# Patient Record
Sex: FEMALE | Race: OTHER | HISPANIC OR LATINO | URBAN - METROPOLITAN AREA
[De-identification: names, ages, dates, MRNs, and addresses within clinical notes are randomized per-mention and may not be internally consistent; named-entity substitution may affect disease eponyms.]

---

## 2023-07-03 ENCOUNTER — EMERGENCY (EMERGENCY)
Facility: HOSPITAL | Age: 28
LOS: 1 days | Discharge: HOME | End: 2023-07-03
Admitting: EMERGENCY MEDICINE
Payer: COMMERCIAL

## 2023-07-03 VITALS
OXYGEN SATURATION: 98 % | HEIGHT: 64 IN | TEMPERATURE: 98 F | HEART RATE: 80 BPM | DIASTOLIC BLOOD PRESSURE: 79 MMHG | RESPIRATION RATE: 16 BRPM | SYSTOLIC BLOOD PRESSURE: 132 MMHG | WEIGHT: 141.98 LBS

## 2023-07-03 VITALS
TEMPERATURE: 98 F | OXYGEN SATURATION: 100 % | SYSTOLIC BLOOD PRESSURE: 128 MMHG | HEART RATE: 75 BPM | DIASTOLIC BLOOD PRESSURE: 87 MMHG | RESPIRATION RATE: 18 BRPM

## 2023-07-03 LAB
ANION GAP SERPL CALC-SCNC: 14 MMOL/L — SIGNIFICANT CHANGE UP (ref 5–17)
ANISOCYTOSIS BLD QL: SLIGHT — SIGNIFICANT CHANGE UP
APPEARANCE UR: CLEAR — SIGNIFICANT CHANGE UP
BACTERIA # UR AUTO: PRESENT /HPF
BASOPHILS # BLD AUTO: 0 K/UL — SIGNIFICANT CHANGE UP (ref 0–0.2)
BASOPHILS NFR BLD AUTO: 0 % — SIGNIFICANT CHANGE UP (ref 0–2)
BILIRUB UR-MCNC: ABNORMAL
BLD GP AB SCN SERPL QL: NEGATIVE — SIGNIFICANT CHANGE UP
BUN SERPL-MCNC: 10 MG/DL — SIGNIFICANT CHANGE UP (ref 7–23)
BURR CELLS BLD QL SMEAR: PRESENT — SIGNIFICANT CHANGE UP
CALCIUM SERPL-MCNC: 8.7 MG/DL — SIGNIFICANT CHANGE UP (ref 8.4–10.5)
CHLORIDE SERPL-SCNC: 103 MMOL/L — SIGNIFICANT CHANGE UP (ref 96–108)
CO2 SERPL-SCNC: 20 MMOL/L — LOW (ref 22–31)
COD CRY URNS QL: ABNORMAL /HPF
COLOR SPEC: YELLOW — SIGNIFICANT CHANGE UP
COMMENT - URINE: SIGNIFICANT CHANGE UP
CREAT SERPL-MCNC: 0.49 MG/DL — LOW (ref 0.5–1.3)
DIFF PNL FLD: ABNORMAL
EGFR: 132 ML/MIN/1.73M2 — SIGNIFICANT CHANGE UP
EOSINOPHIL # BLD AUTO: 0 K/UL — SIGNIFICANT CHANGE UP (ref 0–0.5)
EOSINOPHIL NFR BLD AUTO: 0 % — SIGNIFICANT CHANGE UP (ref 0–6)
EPI CELLS # UR: SIGNIFICANT CHANGE UP /HPF (ref 0–5)
GLUCOSE SERPL-MCNC: 95 MG/DL — SIGNIFICANT CHANGE UP (ref 70–99)
GLUCOSE UR QL: NEGATIVE — SIGNIFICANT CHANGE UP
HCG SERPL-ACNC: 400 MIU/ML — HIGH
HCT VFR BLD CALC: 32.5 % — LOW (ref 34.5–45)
HGB BLD-MCNC: 10.3 G/DL — LOW (ref 11.5–15.5)
HIV 1+2 AB+HIV1 P24 AG SERPL QL IA: SIGNIFICANT CHANGE UP
KETONES UR-MCNC: >=80 MG/DL
LEUKOCYTE ESTERASE UR-ACNC: ABNORMAL
LYMPHOCYTES # BLD AUTO: 2.12 K/UL — SIGNIFICANT CHANGE UP (ref 1–3.3)
LYMPHOCYTES # BLD AUTO: 23.1 % — SIGNIFICANT CHANGE UP (ref 13–44)
MACROCYTES BLD QL: SLIGHT — SIGNIFICANT CHANGE UP
MANUAL SMEAR VERIFICATION: SIGNIFICANT CHANGE UP
MCHC RBC-ENTMCNC: 23.7 PG — LOW (ref 27–34)
MCHC RBC-ENTMCNC: 31.7 GM/DL — LOW (ref 32–36)
MCV RBC AUTO: 74.9 FL — LOW (ref 80–100)
MONOCYTES # BLD AUTO: 0.6 K/UL — SIGNIFICANT CHANGE UP (ref 0–0.9)
MONOCYTES NFR BLD AUTO: 6.5 % — SIGNIFICANT CHANGE UP (ref 2–14)
NEUTROPHILS # BLD AUTO: 6.47 K/UL — SIGNIFICANT CHANGE UP (ref 1.8–7.4)
NEUTROPHILS NFR BLD AUTO: 70.4 % — SIGNIFICANT CHANGE UP (ref 43–77)
NITRITE UR-MCNC: NEGATIVE — SIGNIFICANT CHANGE UP
OVALOCYTES BLD QL SMEAR: SLIGHT — SIGNIFICANT CHANGE UP
PH UR: 6 — SIGNIFICANT CHANGE UP (ref 5–8)
PLAT MORPH BLD: ABNORMAL
PLATELET # BLD AUTO: 292 K/UL — SIGNIFICANT CHANGE UP (ref 150–400)
POIKILOCYTOSIS BLD QL AUTO: SLIGHT — SIGNIFICANT CHANGE UP
POTASSIUM SERPL-MCNC: 3.3 MMOL/L — LOW (ref 3.5–5.3)
POTASSIUM SERPL-SCNC: 3.3 MMOL/L — LOW (ref 3.5–5.3)
PROT UR-MCNC: 30 MG/DL
RBC # BLD: 4.34 M/UL — SIGNIFICANT CHANGE UP (ref 3.8–5.2)
RBC # FLD: 18.1 % — HIGH (ref 10.3–14.5)
RBC BLD AUTO: ABNORMAL
RBC CASTS # UR COMP ASSIST: ABNORMAL /HPF
RH IG SCN BLD-IMP: NEGATIVE — SIGNIFICANT CHANGE UP
SMUDGE CELLS # BLD: PRESENT — SIGNIFICANT CHANGE UP
SODIUM SERPL-SCNC: 137 MMOL/L — SIGNIFICANT CHANGE UP (ref 135–145)
SP GR SPEC: >=1.03 — SIGNIFICANT CHANGE UP (ref 1–1.03)
UROBILINOGEN FLD QL: 1 E.U./DL — SIGNIFICANT CHANGE UP
WBC # BLD: 9.19 K/UL — SIGNIFICANT CHANGE UP (ref 3.8–10.5)
WBC # FLD AUTO: 9.19 K/UL — SIGNIFICANT CHANGE UP (ref 3.8–10.5)
WBC UR QL: ABNORMAL /HPF

## 2023-07-03 PROCEDURE — 87184 SC STD DISK METHOD PER PLATE: CPT

## 2023-07-03 PROCEDURE — 87491 CHLMYD TRACH DNA AMP PROBE: CPT

## 2023-07-03 PROCEDURE — 86850 RBC ANTIBODY SCREEN: CPT

## 2023-07-03 PROCEDURE — 85025 COMPLETE CBC W/AUTO DIFF WBC: CPT

## 2023-07-03 PROCEDURE — 86901 BLOOD TYPING SEROLOGIC RH(D): CPT

## 2023-07-03 PROCEDURE — 87389 HIV-1 AG W/HIV-1&-2 AB AG IA: CPT

## 2023-07-03 PROCEDURE — 81001 URINALYSIS AUTO W/SCOPE: CPT

## 2023-07-03 PROCEDURE — 99284 EMERGENCY DEPT VISIT MOD MDM: CPT

## 2023-07-03 PROCEDURE — 87800 DETECT AGNT MULT DNA DIREC: CPT

## 2023-07-03 PROCEDURE — 87086 URINE CULTURE/COLONY COUNT: CPT

## 2023-07-03 PROCEDURE — 86780 TREPONEMA PALLIDUM: CPT

## 2023-07-03 PROCEDURE — 36415 COLL VENOUS BLD VENIPUNCTURE: CPT

## 2023-07-03 PROCEDURE — 86900 BLOOD TYPING SEROLOGIC ABO: CPT

## 2023-07-03 PROCEDURE — 87591 N.GONORRHOEAE DNA AMP PROB: CPT

## 2023-07-03 PROCEDURE — 76801 OB US < 14 WKS SINGLE FETUS: CPT | Mod: 26

## 2023-07-03 PROCEDURE — 76801 OB US < 14 WKS SINGLE FETUS: CPT

## 2023-07-03 PROCEDURE — 84702 CHORIONIC GONADOTROPIN TEST: CPT

## 2023-07-03 PROCEDURE — 80048 BASIC METABOLIC PNL TOTAL CA: CPT

## 2023-07-03 RX ORDER — VALACYCLOVIR 500 MG/1
1 TABLET, FILM COATED ORAL
Qty: 14 | Refills: 0
Start: 2023-07-03 | End: 2023-07-09

## 2023-07-03 RX ORDER — VALACYCLOVIR 500 MG/1
1000 TABLET, FILM COATED ORAL ONCE
Refills: 0 | Status: COMPLETED | OUTPATIENT
Start: 2023-07-03 | End: 2023-07-03

## 2023-07-03 RX ORDER — VALACYCLOVIR 500 MG/1
1 TABLET, FILM COATED ORAL
Qty: 14 | Refills: 0
Start: 2023-07-03

## 2023-07-03 RX ORDER — HYDROCORTISONE 1 %
1 OINTMENT (GRAM) TOPICAL
Qty: 1 | Refills: 0
Start: 2023-07-03

## 2023-07-03 RX ORDER — LIDOCAINE 4 G/100G
1 CREAM TOPICAL
Qty: 1 | Refills: 0
Start: 2023-07-03

## 2023-07-03 RX ORDER — LIDOCAINE 4 G/100G
1 CREAM TOPICAL ONCE
Refills: 0 | Status: COMPLETED | OUTPATIENT
Start: 2023-07-03 | End: 2023-07-03

## 2023-07-03 RX ORDER — OXYCODONE AND ACETAMINOPHEN 5; 325 MG/1; MG/1
1 TABLET ORAL
Qty: 8 | Refills: 0
Start: 2023-07-03

## 2023-07-03 RX ADMIN — VALACYCLOVIR 1000 MILLIGRAM(S): 500 TABLET, FILM COATED ORAL at 13:39

## 2023-07-03 RX ADMIN — LIDOCAINE 1 APPLICATION(S): 4 CREAM TOPICAL at 15:28

## 2023-07-03 NOTE — ED PROVIDER NOTE - PATIENT PORTAL LINK FT
You can access the FollowMyHealth Patient Portal offered by [facility] by registering at the following website: [email or web address]. By joining [employer]’s FollowMyHealth portal, you will also be able to view your health information using other applications (apps) compatible with our system.

## 2023-07-03 NOTE — ED PROVIDER NOTE - NSFOLLOWUPINSTRUCTIONS_ED_ALL_ED_FT
Return to ED in 2-3 days for repeat HCG level; you may have a repeat ultrasound depending on the HCG level at that time.    Return to ED IMMEDIATELY for sudden increasing pelvic/back pain, vaginal bleeding, feeling weak/faint, or other new/worsening symptoms.  =========================  First Trimester Pregnancy    WHAT YOU NEED TO KNOW:    What do I need to know about the first trimester of pregnancy? A normal pregnancy lasts about 40 weeks. The first trimester lasts from your last period through the 12th week of pregnancy.    What is prenatal care? Prenatal care is a series of visits with your healthcare provider throughout your pregnancy. Prenatal care can help prevent problems during pregnancy and childbirth. At each prenatal visit, your healthcare provider will weigh you and check your blood pressure. Your healthcare provider will also check your baby's heartbeat and growth. You may also need the following at some visits:    A pelvic exam allows your healthcare provider to see your cervix (the bottom part of your uterus). Your healthcare provider will use a speculum to open your vagina. He or she will check the size and shape of your uterus.    Blood tests may be done to check for any of the following:  Gestational diabetes or anemia (low iron level)    Blood type or Rh factor, or certain birth defects    Immunity to certain diseases, such as chickenpox or rubella    An infection, such as a sexually transmitted infection, HIV, or hepatitis B    Hepatitis B may need to be prevented or treated. Hepatitis B is inflammation of the liver caused by the hepatitis B virus (HBV). HBV can spread from a mother to her baby during delivery. You will be checked for HBV as early as possible in the first trimester of each pregnancy. You need the test even if you received the hepatitis B vaccine or were tested before. You may need to have an HBV infection treated before you give birth.    Urine tests may also be done to check for sugar and protein. These can be signs of gestational diabetes or preeclampsia. Urine tests may also be done to check for signs of infection.    A fetal ultrasound shows pictures of your baby inside your uterus. The pictures are used to check your baby's development, movement, and position. Your healthcare provider may be able to tell you if you are having a boy or a girl during the ultrasound. This is usually possible at around 18 to 22 weeks.  Pregnancy Ultrasound      Genetic disorder screening tests may be offered to you. These screening tests check your baby's risk for genetic disorders such as Down syndrome. A screening test includes a blood test and ultrasound. Blood tests may be used to check your DNA or your partner's DNA. Genetic tests are not always accurate or complete. Your baby may be born with a genetic disorder that did not show up in the tests. Talk to your healthcare provider about any concerns you have with genetic testing.  What can I do to have a healthy pregnancy?  Tips for a Healthy Pregnancy    Take prenatal vitamins as directed. Prenatal vitamins can help you get the amount of vitamins and minerals you need during pregnancy. Prenatal vitamins may also decrease the risk of certain birth defects.  Sources of Folic Acid      Eat a variety of healthy foods. Healthy foods include fruits, vegetables, whole-grain breads, low-fat dairy foods, beans, lean meats, and fish. Drink liquids as directed. Ask how much liquid to drink each day and which liquids are best for you. Limit caffeine to less than 200 milligrams each day. Limit your intake of fish to 2 servings each week. Choose fish low in mercury such as canned light tuna, shrimp, crab, salmon, cod, or tilapia. Do not eat fish high in mercury such as swordfish, tilefish, salvador mackerel, and shark.  Healthy Foods      Drink liquids as directed. Ask how much liquid to drink each day and which liquids are best for you. Some healthy liquids include milk, water, and juice. It is not clear how caffeine affects pregnancy. Limit your intake of caffeine to less than 200 mg each day to avoid possible health problems. Caffeine may be found in coffee, tea, cola, sports drinks, and chocolate. Do not drink alcohol.    Talk to your healthcare provider before you take medicines. Many medicines can harm your baby, especially during early pregnancy. Ask your healthcare provider before you take any medicines, including over-the-counter medicines, vitamins, herbs, or food supplements. Never use illegal or street drugs while you are pregnant. Talk to your healthcare provider if you are having trouble quitting street drugs.    Exercise as directed. Ask your healthcare provider about the best exercise plan for you. Exercise may help you feel better and make your labor and delivery easier.  Walking During Pregnancy      Do not smoke. Smoking increases your risk of a miscarriage and other health problems during your pregnancy. Smoking can cause your baby to be born too early or weigh less at birth. Quit smoking as soon as you think you might be pregnant. Ask your healthcare provider for information if you need help quitting.  What are some safety tips?    Do not use a hot tub or sauna while you are pregnant, especially during your first trimester. Hot tubs and saunas may raise your baby's temperature and increase the risk of birth defects. It also increases your risk of miscarriage.    Protect yourself from illness. Toxoplasmosis is a disease that can cause birth defects. To protect yourself from this disease, do not clean your cat's litter box yourself. Ask someone else to clean your cat's litter box while you are pregnant. Also, do not eat raw meat or unwashed fruits and vegetables. Wash your hands after you touch raw meat, and eat only well-cooked meat. Wash fruits and vegetables well before you eat them.  Handwashing  When should I seek immediate care?    You have pain or cramping in your abdomen or low back.    You have severe vaginal bleeding or clotting.  When should I call my doctor or obstetrician?    You have light bleeding.    You have chills or a fever.    You have vaginal itching, burning, or pain.    You have yellow, green, white, or foul-smelling vaginal discharge.    You have pain or burning when you urinate, less urine than usual, or pink or bloody urine.    You have questions or concerns about your condition or care.  CARE AGREEMENT:    You have the right to help plan your care. Learn about your health condition and how it may be treated. Discuss treatment options with your healthcare providers to decide what care you want to receive. You always have the right to refuse treatment.  ===================  Genital Herpes Infection    WHAT YOU NEED TO KNOW:    What is a genital herpes infection? Genital herpes is a sexually transmitted infection (STI) caused by the herpes simplex virus (HSV). HSV has 2 types. A genital HSV infection is usually caused by HSV type 2. HSV type 1 usually affects the oral area but may also affect the genitals.    What increases my risk for a genital HSV infection?    Close contact with someone who has an HSV infection    Unprotected sex, more than 1 sex partner, or being a man who has sex with men    Being female (HSV passed during vaginal sex)    A weak immune system caused by an illness or condition such as a cold, flu, or HIV    Shared personal items, such as towels or razors    Broken or torn skin on your genitals or buttocks  What are the signs and symptoms of a genital HSV infection? You may not have any signs or symptoms. Signs and symptoms that do develop may appear suddenly and last 8 days to 3 weeks. Symptoms will go away and may come back again. Your blisters may burst or join together to form large open sores. Sores on your skin will then dry up (crust over). You may also have any of the following:    Pain, itching, or tingling at the affected area    Blisters or small, round, shallow sores on the thighs, genitals, or buttocks    Fever, headache, or muscle pains    Swollen lymph nodes in your groin    Loss of appetite    Vaginal discharge that is not normal for you    Pain when you urinate  How is a genital HSV infection diagnosed? Your healthcare provider will examine your blisters or sores. Your provider may ask if you have other medical conditions. Tell your provider when your blisters or sores started, and about your other symptoms. Tell your provider about any STIs you or your partner may have. You may also need any of the following:    Skin or fluid samples may be sent to a lab to be tested for HSV. Your provider may swab fluid from a blister. Your provider may also scrape skin from a sore. Skin scraping needs to be done within 48 hours of the first symptom, when the outbreak is most severe.    Blood tests may show you have developed antibodies from exposure to HSV. Blood tests may also show which type of HSV you have.  How is a genital HSV infection treated? An HSV infection cannot be cured. The blisters or sores may heal on their own within 8 days. The following may be used to treat or prevent symptoms:    Antiviral medicine helps relieve symptoms and shortens the amount of time you have blisters. You may also need to take it daily to prevent blisters.    Numbing cream or ointment may help decrease pain.  How can I manage my symptoms?    Keep your blisters and sores clean and dry. Do not wipe or scrub your blisters or sores. Rinse the area with clean, warm water and gently pat the area dry.    Wear cotton underwear and loose clothing to prevent irritation. Cover open sores with a bandage to prevent your clothes from sticking to them.    Use ice to help reduce your swelling and pain. Use an ice pack, or put crushed ice in a plastic bag. Cover the bag with a towel before you place it on your skin. Apply ice for 15 to 20 minutes every hour, or as directed.    Use heat to help decrease your muscle pain. Use an electric heating pad set on low, a hot water bottle, or a warm compress. Always put a cloth between your skin and the heat pack to prevent burns. A warm bath or shower may also help decrease your muscle pain. Apply heat for 20 to 30 minutes every 2 hours, or as directed.  How can I prevent the spread of HSV?    Tell your partner you have genital HSV. Ask your healthcare provider for ways to tell your partner about your infection. Your partner may also need to be tested for genital HSV even if no symptoms develop. Do not have sex while you or your partner have symptoms.    Use a male or female condom during sex. This includes oral, genital, and anal sex. Use a new condom each time. Use a polyurethane condom if you are allergic to latex. Do not use male and female condoms together. Ask for more information about the correct way to use condoms.    Do not share personal items with others. Examples include towels, razors, and clothing.    Do not touch your sores, blisters, or scabs. The virus may spread from your fingers.    Wash your hands often. Use soap and water. Use germ-killing gel if soap and water are not available.  Handwashing      Tell your healthcare providers if you are pregnant. HSV can spread to your baby during your pregnancy or during vaginal delivery. You may need to take antiviral medicine before you give birth. Antiviral medicine can help prevent the spread of infection to your baby. You may need a  section () if your symptoms are severe. Do not breastfeed if you have blisters or sores on your breast. Do not give your baby pumped breast milk if the pump or milk touches a blister or sore.    Get tested for STIs if you are sexually active. HSV type 2 increases your risk for HIV. You should get tested after you have sex with any new partner or if you have sex without a condom. This includes oral, genital, or anal sex.  When should I seek immediate care?    You have changes in your vision or sudden eye pain.    You have eye pain when you look into bright lights.    You have sores on your eyes.    You have a severe headache or are confused.  When should I call my doctor?    You see blood or fluid that is not clear coming out of your sores, penis, or vagina.    Your symptoms get worse, have not gone away within 8 days, or come back again.    You have trouble urinating.    You know or think you are pregnant, and you are bleeding from your vagina.    Your eyes feel irritated, or you feel like you have something in your eye.    You have questions or concerns about your condition or care.  CARE AGREEMENT:    You have the right to help plan your care. Learn about your health condition and how it may be treated. Discuss treatment options with your healthcare providers to decide what care you want to receive. You always have the right to refuse treatment.

## 2023-07-03 NOTE — ED PROVIDER NOTE - CLINICAL SUMMARY MEDICAL DECISION MAKING FREE TEXT BOX
Pt's history and exam strongly suggestive of genital herpes, classic case. No evidence of vulvar cellulitis or nec fasc.  Will test for other STI and HIV, treat symptomatically and start antiviral tx for first episode genital herpes.  Triage complaint was due to her embarrassment discussing her actual complaint.

## 2023-07-03 NOTE — ED PROVIDER NOTE - OBJECTIVE STATEMENT
Patient is a 28-year-old female without significant medical history complains of pain vulvar lesions over the past 3 days.  She reports a new sexual partner about 2 weeks ago, was intoxicated and does not remember if it were used.  It is extremely painful for her to urinate and the pain radiates to the low back.    She also reports some tender bumps to the inside of her lower lip that have essentially resolved.  No vaginal bleeding. No fever or chills. No abdominal pain. Had D&C a few months ago for VTOP.  No hx of STI. Patient is a 28-year-old female without significant medical history complains of pain vulvar lesions over the past 3 days.  She reports a new sexual partner about 2 weeks ago, was intoxicated and does not remember if it were used.  It is extremely painful for her to urinate and she has some mild dull pain in the low back.    She also reports some tender bumps to the inside of her lower lip that have essentially resolved.  No vaginal bleeding. No fever or chills. No abdominal pain. Had D&C a few months ago for VTOP.  No hx of STI.

## 2023-07-03 NOTE — ED PROVIDER NOTE - PHYSICAL EXAMINATION
CONSTITUTIONAL: NAD   SKIN: Normal color and turgor.    HEAD: NC/AT.  EYES: Conjunctiva clear. EOMI. PERRL.    ENT: Airway clear. Normal voice.   RESPIRATORY:  Normal work of breathing. Lungs CTAB.  CARDIOVASCULAR:  RRR, S1S2. No M/R/G.      GI:  Abdomen soft, nontender.   : several vesicular lesions to right labia majora. no swelling or surrounding cellulitis.   no internal vaginal lesions seen. no cervical discharge. no CMT.     MSK: Neck supple.  No LE edema or calf tenderness. No joint swelling or ROM limitation.  NEURO: Alert; CN: grossly intact. Speech clear.  CLAROS. Gait steady.

## 2023-07-03 NOTE — ED ADULT NURSE NOTE - OBJECTIVE STATEMENT
28y F, presents to the ED c/o body aches, chills, cough, nausea, vomiting, and fatigue x3 days. Pt reports taking ibuprofen and tylenol w/o relief. Denies Denies chest pain, SOB, fever. Pt A&Ox4, ambulatory with steady gait, speaking in clear/full sentences, vital signs stable on room air.

## 2023-07-03 NOTE — ED ADULT TRIAGE NOTE - CHIEF COMPLAINT QUOTE
Pt presenting with 3 days of flu like symptoms. Endorsing n/v, fatigue and cough. Denies CP/SOB/fevers/sore throat.

## 2023-07-03 NOTE — ED PROVIDER NOTE - PROGRESS NOTE DETAILS
. No EUP or IUP seen on US (transabdominal only, pt was unable to tolerate TVUS due to painful vulvar herpetic infection).  Patient requesting discharge home, does not want to have GYN consult today. Discussed possible early pregnancy vs ectopic pregnancy, lengthy discussion regarding the importance of follow up (to return to ED in 2-3 days for HCG, +/- US), and to return to ED immediately for sudden increasing pelvic/back pain, vaginal bleeding, feeling weak/faint, or other new/worsening symptoms. Pt expressed clear understanding with teach-back.  Rh negative, not bleeding, no Rhogam.  Pain currently well controlled.

## 2023-07-04 LAB
C TRACH RRNA SPEC QL NAA+PROBE: SIGNIFICANT CHANGE UP
N GONORRHOEA RRNA SPEC QL NAA+PROBE: SIGNIFICANT CHANGE UP
SPECIMEN SOURCE: SIGNIFICANT CHANGE UP
T PALLIDUM AB TITR SER: NEGATIVE — SIGNIFICANT CHANGE UP
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
## 2023-07-05 DIAGNOSIS — M25.561 PAIN IN RIGHT KNEE: ICD-10-CM

## 2023-07-05 DIAGNOSIS — G89.29 OTHER CHRONIC PAIN: ICD-10-CM
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
## 2023-07-05 LAB
-  CLINDAMYCIN: SIGNIFICANT CHANGE UP
-  LEVOFLOXACIN: SIGNIFICANT CHANGE UP
CANDIDA AB TITR SER: DETECTED
CULTURE RESULTS: SIGNIFICANT CHANGE UP
G VAGINALIS DNA SPEC QL NAA+PROBE: DETECTED
METHOD TYPE: SIGNIFICANT CHANGE UP
ORGANISM # SPEC MICROSCOPIC CNT: SIGNIFICANT CHANGE UP
ORGANISM # SPEC MICROSCOPIC CNT: SIGNIFICANT CHANGE UP
SPECIMEN SOURCE: SIGNIFICANT CHANGE UP
T VAGINALIS SPEC QL WET PREP: SIGNIFICANT CHANGE UP

## 2023-07-06 ENCOUNTER — EMERGENCY (EMERGENCY)
Facility: HOSPITAL | Age: 28
LOS: 1 days | Discharge: HOME | End: 2023-07-06
Admitting: STUDENT IN AN ORGANIZED HEALTH CARE EDUCATION/TRAINING PROGRAM
Payer: COMMERCIAL

## 2023-07-06 VITALS
SYSTOLIC BLOOD PRESSURE: 120 MMHG | HEART RATE: 75 BPM | WEIGHT: 141.98 LBS | RESPIRATION RATE: 18 BRPM | TEMPERATURE: 98 F | HEIGHT: 64 IN | OXYGEN SATURATION: 100 % | DIASTOLIC BLOOD PRESSURE: 77 MMHG

## 2023-07-06 DIAGNOSIS — O02.81 INAPPROPRIATE CHANGE IN QUANTITATIVE HUMAN CHORIONIC GONADOTROPIN (HCG) IN EARLY PREGNANCY: ICD-10-CM

## 2023-07-06 LAB — HCG SERPL-ACNC: 1487 MIU/ML — HIGH

## 2023-07-06 PROCEDURE — 36415 COLL VENOUS BLD VENIPUNCTURE: CPT

## 2023-07-06 PROCEDURE — 84702 CHORIONIC GONADOTROPIN TEST: CPT

## 2023-07-06 PROCEDURE — 99283 EMERGENCY DEPT VISIT LOW MDM: CPT

## 2023-07-06 RX ORDER — METRONIDAZOLE 7.5 MG/G
1 GEL VAGINAL
Qty: 1 | Refills: 0
Start: 2023-07-06 | End: 2023-07-10

## 2023-07-06 RX ORDER — ACETAMINOPHEN 500 MG
650 TABLET ORAL ONCE
Refills: 0 | Status: COMPLETED | OUTPATIENT
Start: 2023-07-06 | End: 2023-07-06

## 2023-07-06 RX ADMIN — Medication 650 MILLIGRAM(S): at 15:15

## 2023-07-06 NOTE — ED PROVIDER NOTE - NEUROLOGICAL, MLM
Patient notified of amended results. Additional views canceled. RUKHSANA Herrera RN   Alert and oriented, no focal deficits, no motor or sensory deficits.

## 2023-07-06 NOTE — ED PROVIDER NOTE - OBJECTIVE STATEMENT
29 y/o f presents for hcg follow up.  Pt stating she was in ED 3 days ago with vaginal lesions, incidentally pregnant and had a low hcg level and nothing on u/s at the time, was asked to return.  Pt stating she has no pain or bleeding.

## 2023-07-06 NOTE — ED PROVIDER NOTE - CLINICAL SUMMARY MEDICAL DECISION MAKING FREE TEXT BOX
27 y/o f presents for follow up hcg after had hcg 400 in ED 3 days ago with no findings on u/s.  Pt with no pain or bleeding, denies pain or bleeding at any point, hcg is rising appropriately to 1487, not concerning for ectopic, will d/c, can f/u with her ob/gyn, return to ED if sx worsen.

## 2023-07-06 NOTE — ED ADULT NURSE NOTE - CHIEF COMPLAINT QUOTE
"I was told to come back to get my HCG levels tested. I came in the 3rd and was told they where 400 but they could not see anything in the Ultrasound". Denies vaginal bleeding.

## 2023-07-06 NOTE — ED PROVIDER NOTE - PATIENT PORTAL LINK FT
You can access the FollowMyHealth Patient Portal offered by [facility] by registering at the following website: [email or web address]. By joining [employer]’s FollowMyHealth portal, you will also be able to view your health information using other applications (apps) compatible with our system.

## 2023-07-06 NOTE — ED ADULT NURSE NOTE - NSFALLUNIVINTERV_ED_ALL_ED
Bed/Stretcher in lowest position, wheels locked, appropriate side rails in place/Call bell, personal items and telephone in reach/Instruct patient to call for assistance before getting out of bed/chair/stretcher/Non-slip footwear applied when patient is off stretcher/[place] to call system/Physically safe environment - no spills, clutter or unnecessary equipment/Purposeful proactive rounding/Room/bathroom lighting operational, light cord in reach

## 2023-09-14 ENCOUNTER — EMERGENCY (EMERGENCY)
Facility: HOSPITAL | Age: 28
LOS: 1 days | Discharge: HOME | End: 2023-09-14
Attending: EMERGENCY MEDICINE | Admitting: EMERGENCY MEDICINE
Payer: COMMERCIAL

## 2023-09-14 VITALS
WEIGHT: 134.04 LBS | OXYGEN SATURATION: 98 % | HEART RATE: 96 BPM | SYSTOLIC BLOOD PRESSURE: 110 MMHG | DIASTOLIC BLOOD PRESSURE: 71 MMHG | RESPIRATION RATE: 18 BRPM | TEMPERATURE: 98 F | HEIGHT: 72 IN

## 2023-09-14 VITALS
DIASTOLIC BLOOD PRESSURE: 67 MMHG | RESPIRATION RATE: 18 BRPM | SYSTOLIC BLOOD PRESSURE: 109 MMHG | TEMPERATURE: 98 F | HEART RATE: 97 BPM | OXYGEN SATURATION: 98 %

## 2023-09-14 DIAGNOSIS — O99.891 OTHER SPECIFIED DISEASES AND CONDITIONS COMPLICATING PREGNANCY: ICD-10-CM

## 2023-09-14 DIAGNOSIS — Z3A.14 14 WEEKS GESTATION OF PREGNANCY: ICD-10-CM

## 2023-09-14 DIAGNOSIS — R55 SYNCOPE AND COLLAPSE: ICD-10-CM

## 2023-09-14 DIAGNOSIS — Z88.8 ALLERGY STATUS TO OTHER DRUGS, MEDICAMENTS AND BIOLOGICAL SUBSTANCES: ICD-10-CM

## 2023-09-14 DIAGNOSIS — R10.10 UPPER ABDOMINAL PAIN, UNSPECIFIED: ICD-10-CM

## 2023-09-14 PROCEDURE — 85025 COMPLETE CBC W/AUTO DIFF WBC: CPT

## 2023-09-14 PROCEDURE — 96374 THER/PROPH/DIAG INJ IV PUSH: CPT

## 2023-09-14 PROCEDURE — 99284 EMERGENCY DEPT VISIT MOD MDM: CPT

## 2023-09-14 PROCEDURE — 82553 CREATINE MB FRACTION: CPT

## 2023-09-14 PROCEDURE — 80053 COMPREHEN METABOLIC PANEL: CPT

## 2023-09-14 PROCEDURE — 85730 THROMBOPLASTIN TIME PARTIAL: CPT

## 2023-09-14 PROCEDURE — 93010 ELECTROCARDIOGRAM REPORT: CPT

## 2023-09-14 PROCEDURE — 85610 PROTHROMBIN TIME: CPT

## 2023-09-14 PROCEDURE — 76805 OB US >/= 14 WKS SNGL FETUS: CPT | Mod: 26

## 2023-09-14 PROCEDURE — 76805 OB US >/= 14 WKS SNGL FETUS: CPT

## 2023-09-14 PROCEDURE — 84484 ASSAY OF TROPONIN QUANT: CPT

## 2023-09-14 PROCEDURE — 99285 EMERGENCY DEPT VISIT HI MDM: CPT | Mod: 25

## 2023-09-14 PROCEDURE — 76817 TRANSVAGINAL US OBSTETRIC: CPT

## 2023-09-14 PROCEDURE — 83735 ASSAY OF MAGNESIUM: CPT

## 2023-09-14 PROCEDURE — 82550 ASSAY OF CK (CPK): CPT

## 2023-09-14 PROCEDURE — 36415 COLL VENOUS BLD VENIPUNCTURE: CPT

## 2023-09-14 PROCEDURE — 76817 TRANSVAGINAL US OBSTETRIC: CPT | Mod: 26

## 2023-09-14 PROCEDURE — 93005 ELECTROCARDIOGRAM TRACING: CPT

## 2023-09-14 RX ORDER — ONDANSETRON 8 MG/1
4 TABLET, FILM COATED ORAL ONCE
Refills: 0 | Status: COMPLETED | OUTPATIENT
Start: 2023-09-14 | End: 2023-09-14

## 2023-09-14 RX ORDER — SODIUM CHLORIDE 9 MG/ML
1000 INJECTION INTRAMUSCULAR; INTRAVENOUS; SUBCUTANEOUS ONCE
Refills: 0 | Status: COMPLETED | OUTPATIENT
Start: 2023-09-14 | End: 2023-09-14

## 2023-09-14 RX ADMIN — SODIUM CHLORIDE 1000 MILLILITER(S): 9 INJECTION INTRAMUSCULAR; INTRAVENOUS; SUBCUTANEOUS at 15:40

## 2023-09-14 RX ADMIN — ONDANSETRON 4 MILLIGRAM(S): 8 TABLET, FILM COATED ORAL at 15:40

## 2023-09-14 NOTE — ED ADULT NURSE NOTE - OBJECTIVE STATEMENT
patient presents to ED with syncope, states she hit her abdomen, denies LOC, cp, sob, fever, cough, n/v. is complaining of mild dizziness. A&OX4. stable at bed.

## 2023-09-14 NOTE — ED PROVIDER NOTE - OBJECTIVE STATEMENT
28 F at 14 weeks pregnant w syncopal/near complete syncopal event- was in the bathrrom- felt very lightheaded- passed out- thinks she hit her abd on the tub- mild upper abd pain- no vag bleeding/dc no n/v no ha no cp  mod severity  no AC  no sig exac/allev factors

## 2023-09-14 NOTE — ED PROVIDER NOTE - PATIENT PORTAL LINK FT
You can access the FollowMyHealth Patient Portal offered by [facility] by registering at the following website: [email or web address]. By joining [employer]’s FollowMyHealth portal, you will also be able to view your health information using other applications (apps) compatible with our system.

## 2023-09-14 NOTE — ED PROVIDER NOTE - CLINICAL SUMMARY MEDICAL DECISION MAKING FREE TEXT BOX
28 F at 14 weeks ga w syncopal episode- likely VV by hx- px thinks she may have hit her abd= us neg- now feels well no pain, wants to go home  si/sx to return to the ER d/w px

## 2023-09-14 NOTE — ED ADULT NURSE NOTE - NSFALLRISK_ED_ALL_ED
I will STOP taking the medications listed below when I get home from the hospital:    aspirin 81 mg oral tablet  -- 2 tab(s) by mouth once a day (at bedtime)
No

## 2023-09-14 NOTE — ED ADULT NURSE NOTE - NSFALLUNIVINTERV_ED_ALL_ED
Bed/Stretcher in lowest position, wheels locked, appropriate side rails in place/Call bell, personal items and telephone in reach/Instruct patient to call for assistance before getting out of bed/chair/stretcher/Non-slip footwear applied when patient is off stretcher/[place] to call system/Physically safe environment - no spills, clutter or unnecessary equipment/Purposeful proactive rounding/Room/bathroom lighting operational, light cord in reach

## 2023-09-14 NOTE — ED ADULT NURSE NOTE - BEFAST ARM NUMBNESS
12/22/17 0821   Patient Assessment/Suction   Level of Consciousness (AVPU) alert   Respiratory Effort Normal;Unlabored   Expansion/Accessory Muscles/Retractions no use of accessory muscles;no retractions;expansion symmetric   Cough Frequency infrequent   Cough Type none   PRE-TX-O2-ETCO2   O2 Device (Oxygen Therapy) room air   SpO2 98 %   Pulse Oximetry Type Intermittent   $ Pulse Oximetry - Multiple Charge Pulse Oximetry - Multiple   Pulse 85   Resp 16       Patient sitting up in bed eating breakfast with no respiratory distress noted.    No

## 2023-09-14 NOTE — ED ADULT TRIAGE NOTE - CHIEF COMPLAINT QUOTE
Pt presents to ED here for syncopal episode an hour ago in the bathroom. Pt is 14 weeks pregnant. Denies headache od dizziness, cp, sob, abd pain. PT reports she landed on her abdomen. Pt A&Ox4. Pt is conversive in full sentences and ambulatory. no known PMHx.

## 2023-10-11 ENCOUNTER — NON-APPOINTMENT (OUTPATIENT)
Age: 28
End: 2023-10-11

## 2023-10-12 ENCOUNTER — APPOINTMENT (OUTPATIENT)
Dept: OBGYN | Facility: CLINIC | Age: 28
End: 2023-10-12
Payer: MEDICAID

## 2023-10-12 ENCOUNTER — TRANSCRIPTION ENCOUNTER (OUTPATIENT)
Age: 28
End: 2023-10-12

## 2023-10-12 ENCOUNTER — NON-APPOINTMENT (OUTPATIENT)
Age: 28
End: 2023-10-12

## 2023-10-12 VITALS
DIASTOLIC BLOOD PRESSURE: 60 MMHG | OXYGEN SATURATION: 98 % | SYSTOLIC BLOOD PRESSURE: 110 MMHG | BODY MASS INDEX: 24.59 KG/M2 | WEIGHT: 144 LBS | HEIGHT: 64 IN

## 2023-10-12 PROBLEM — Z00.00 ENCOUNTER FOR PREVENTIVE HEALTH EXAMINATION: Status: ACTIVE | Noted: 2023-10-12

## 2023-10-12 PROCEDURE — 99202 OFFICE O/P NEW SF 15 MIN: CPT | Mod: TH

## 2023-10-13 ENCOUNTER — TRANSCRIPTION ENCOUNTER (OUTPATIENT)
Age: 28
End: 2023-10-13

## 2023-10-13 LAB
BLD GP AB SCN SERPL QL: NORMAL
FERRITIN SERPL-MCNC: 6 NG/ML
HCT VFR BLD CALC: 29.2 %
HGB BLD-MCNC: 9.2 G/DL
IRON SATN MFR SERPL: 4 %
IRON SERPL-MCNC: 20 UG/DL
MCHC RBC-ENTMCNC: 25.7 PG
MCHC RBC-ENTMCNC: 31.5 GM/DL
MCV RBC AUTO: 81.6 FL
PLATELET # BLD AUTO: 408 K/UL
RBC # BLD: 3.58 M/UL
RBC # BLD: 3.58 M/UL
RBC # FLD: 16.4 %
RETICS # AUTO: 1.6 %
RETICS AGGREG/RBC NFR: 56.6 K/UL
TIBC SERPL-MCNC: 478 UG/DL
UIBC SERPL-MCNC: 457 UG/DL
WBC # FLD AUTO: 9.06 K/UL

## 2023-10-14 ENCOUNTER — NON-APPOINTMENT (OUTPATIENT)
Age: 28
End: 2023-10-14

## 2023-10-15 LAB — ABO + RH PNL BLD: NORMAL

## 2023-10-19 RX ORDER — CHLORHEXIDINE GLUCONATE 4 %
325 (65 FE) LIQUID (ML) TOPICAL
Qty: 90 | Refills: 1 | Status: ACTIVE | COMMUNITY
Start: 2023-10-19 | End: 1900-01-01

## 2023-11-02 ENCOUNTER — NON-APPOINTMENT (OUTPATIENT)
Age: 28
End: 2023-11-02

## 2023-11-02 ENCOUNTER — APPOINTMENT (OUTPATIENT)
Dept: OBGYN | Facility: CLINIC | Age: 28
End: 2023-11-02

## 2023-11-09 ENCOUNTER — ASOB RESULT (OUTPATIENT)
Age: 28
End: 2023-11-09

## 2023-11-09 ENCOUNTER — APPOINTMENT (OUTPATIENT)
Dept: ANTEPARTUM | Facility: CLINIC | Age: 28
End: 2023-11-09
Payer: MEDICAID

## 2023-11-09 PROCEDURE — 76811 OB US DETAILED SNGL FETUS: CPT

## 2023-11-15 ENCOUNTER — APPOINTMENT (OUTPATIENT)
Dept: OBGYN | Facility: CLINIC | Age: 28
End: 2023-11-15
Payer: MEDICAID

## 2023-11-15 ENCOUNTER — NON-APPOINTMENT (OUTPATIENT)
Age: 28
End: 2023-11-15

## 2023-11-15 VITALS
OXYGEN SATURATION: 99 % | WEIGHT: 150 LBS | BODY MASS INDEX: 25.75 KG/M2 | DIASTOLIC BLOOD PRESSURE: 60 MMHG | SYSTOLIC BLOOD PRESSURE: 120 MMHG

## 2023-11-15 PROCEDURE — 99213 OFFICE O/P EST LOW 20 MIN: CPT | Mod: TH

## 2023-11-20 ENCOUNTER — LABORATORY RESULT (OUTPATIENT)
Age: 28
End: 2023-11-20

## 2023-11-20 ENCOUNTER — APPOINTMENT (OUTPATIENT)
Dept: HEMATOLOGY ONCOLOGY | Facility: CLINIC | Age: 28
End: 2023-11-20
Payer: MEDICAID

## 2023-11-20 VITALS
SYSTOLIC BLOOD PRESSURE: 115 MMHG | WEIGHT: 154 LBS | OXYGEN SATURATION: 96 % | DIASTOLIC BLOOD PRESSURE: 72 MMHG | HEIGHT: 64 IN | RESPIRATION RATE: 18 BRPM | HEART RATE: 101 BPM | BODY MASS INDEX: 26.29 KG/M2

## 2023-11-20 LAB
FERRITIN SERPL-MCNC: 8 NG/ML
IRON SATN MFR SERPL: 7 %
IRON SERPL-MCNC: 32 UG/DL
RBC # BLD: 3.42 M/UL
RETICS # AUTO: 1.5 %
RETICS AGGREG/RBC NFR: 52 K/UL
TIBC SERPL-MCNC: 457 UG/DL
UIBC SERPL-MCNC: 425 UG/DL

## 2023-11-20 PROCEDURE — 99204 OFFICE O/P NEW MOD 45 MIN: CPT

## 2023-11-21 LAB
FOLATE SERPL-MCNC: 15.6 NG/ML
VIT B12 SERPL-MCNC: 163 PG/ML

## 2023-11-21 RX ORDER — FOLIC ACID 1 MG/1
1 TABLET ORAL DAILY
Qty: 1 | Refills: 3 | Status: ACTIVE | COMMUNITY
Start: 2023-11-21 | End: 1900-01-01

## 2023-11-22 ENCOUNTER — NON-APPOINTMENT (OUTPATIENT)
Age: 28
End: 2023-11-22

## 2023-11-27 ENCOUNTER — OUTPATIENT (OUTPATIENT)
Dept: OUTPATIENT SERVICES | Facility: HOSPITAL | Age: 28
LOS: 1 days | End: 2023-11-27
Payer: COMMERCIAL

## 2023-11-27 ENCOUNTER — APPOINTMENT (OUTPATIENT)
Dept: INFUSION THERAPY | Facility: CLINIC | Age: 28
End: 2023-11-27

## 2023-11-27 VITALS
RESPIRATION RATE: 15 BRPM | OXYGEN SATURATION: 99 % | DIASTOLIC BLOOD PRESSURE: 77 MMHG | SYSTOLIC BLOOD PRESSURE: 127 MMHG | TEMPERATURE: 98 F | WEIGHT: 151.9 LBS | HEART RATE: 92 BPM | HEIGHT: 64 IN

## 2023-11-27 VITALS
SYSTOLIC BLOOD PRESSURE: 124 MMHG | RESPIRATION RATE: 16 BRPM | OXYGEN SATURATION: 98 % | TEMPERATURE: 98 F | HEART RATE: 86 BPM | DIASTOLIC BLOOD PRESSURE: 72 MMHG

## 2023-11-27 DIAGNOSIS — D50.9 IRON DEFICIENCY ANEMIA, UNSPECIFIED: ICD-10-CM

## 2023-11-27 PROCEDURE — 96365 THER/PROPH/DIAG IV INF INIT: CPT

## 2023-11-27 PROCEDURE — 96372 THER/PROPH/DIAG INJ SC/IM: CPT

## 2023-11-27 RX ORDER — PREGABALIN 225 MG/1
1000 CAPSULE ORAL ONCE
Refills: 0 | Status: COMPLETED | OUTPATIENT
Start: 2023-11-27 | End: 2023-11-27

## 2023-11-27 RX ORDER — IRON SUCROSE 20 MG/ML
200 INJECTION, SOLUTION INTRAVENOUS ONCE
Refills: 0 | Status: COMPLETED | OUTPATIENT
Start: 2023-11-27 | End: 2023-11-27

## 2023-11-27 RX ADMIN — IRON SUCROSE 110 MILLIGRAM(S): 20 INJECTION, SOLUTION INTRAVENOUS at 16:50

## 2023-11-27 RX ADMIN — PREGABALIN 1000 MICROGRAM(S): 225 CAPSULE ORAL at 17:50

## 2023-11-27 RX ADMIN — IRON SUCROSE 200 MILLIGRAM(S): 20 INJECTION, SOLUTION INTRAVENOUS at 17:50

## 2023-12-04 ENCOUNTER — APPOINTMENT (OUTPATIENT)
Dept: INFUSION THERAPY | Facility: CLINIC | Age: 28
End: 2023-12-04

## 2023-12-04 ENCOUNTER — OUTPATIENT (OUTPATIENT)
Dept: OUTPATIENT SERVICES | Facility: HOSPITAL | Age: 28
LOS: 1 days | End: 2023-12-04
Payer: COMMERCIAL

## 2023-12-04 VITALS
DIASTOLIC BLOOD PRESSURE: 69 MMHG | HEART RATE: 96 BPM | OXYGEN SATURATION: 98 % | SYSTOLIC BLOOD PRESSURE: 107 MMHG | TEMPERATURE: 98 F | RESPIRATION RATE: 16 BRPM

## 2023-12-04 VITALS
TEMPERATURE: 99 F | DIASTOLIC BLOOD PRESSURE: 68 MMHG | RESPIRATION RATE: 16 BRPM | HEART RATE: 99 BPM | HEIGHT: 64 IN | WEIGHT: 153 LBS | SYSTOLIC BLOOD PRESSURE: 108 MMHG | OXYGEN SATURATION: 98 %

## 2023-12-04 DIAGNOSIS — D50.9 IRON DEFICIENCY ANEMIA, UNSPECIFIED: ICD-10-CM

## 2023-12-04 PROCEDURE — 96365 THER/PROPH/DIAG IV INF INIT: CPT

## 2023-12-04 PROCEDURE — 96372 THER/PROPH/DIAG INJ SC/IM: CPT

## 2023-12-04 RX ORDER — IRON SUCROSE 20 MG/ML
200 INJECTION, SOLUTION INTRAVENOUS ONCE
Refills: 0 | Status: COMPLETED | OUTPATIENT
Start: 2023-12-04 | End: 2023-12-04

## 2023-12-04 RX ORDER — PREGABALIN 225 MG/1
1000 CAPSULE ORAL ONCE
Refills: 0 | Status: COMPLETED | OUTPATIENT
Start: 2023-12-04 | End: 2023-12-04

## 2023-12-04 RX ADMIN — IRON SUCROSE 200 MILLIGRAM(S): 20 INJECTION, SOLUTION INTRAVENOUS at 17:30

## 2023-12-04 RX ADMIN — IRON SUCROSE 110 MILLIGRAM(S): 20 INJECTION, SOLUTION INTRAVENOUS at 16:38

## 2023-12-04 RX ADMIN — PREGABALIN 1000 MICROGRAM(S): 225 CAPSULE ORAL at 16:39

## 2023-12-04 NOTE — DISCHARGE INSTRUCTIONS: GENERAL THERAPY - NSFACILITYCONTAFTRHOUR_HEME_A_AMB
[facility] ([phone]) [facility] ([phone])

## 2023-12-04 NOTE — DISCHARGE INSTRUCTIONS: GENERAL THERAPY - NSAPPTSFOLLOWUP_HEME_A_AMB
[facility]... [facility]...

## 2023-12-11 ENCOUNTER — APPOINTMENT (OUTPATIENT)
Dept: INFUSION THERAPY | Facility: CLINIC | Age: 28
End: 2023-12-11

## 2023-12-13 ENCOUNTER — NON-APPOINTMENT (OUTPATIENT)
Age: 28
End: 2023-12-13

## 2023-12-13 ENCOUNTER — APPOINTMENT (OUTPATIENT)
Dept: OBGYN | Facility: CLINIC | Age: 28
End: 2023-12-13
Payer: MEDICAID

## 2023-12-13 VITALS
SYSTOLIC BLOOD PRESSURE: 110 MMHG | DIASTOLIC BLOOD PRESSURE: 70 MMHG | WEIGHT: 159 LBS | OXYGEN SATURATION: 100 % | HEART RATE: 90 BPM | BODY MASS INDEX: 27.29 KG/M2

## 2023-12-13 DIAGNOSIS — O26.899 OTHER SPECIFIED PREGNANCY RELATED CONDITIONS, UNSPECIFIED TRIMESTER: ICD-10-CM

## 2023-12-13 DIAGNOSIS — Z67.91 OTHER SPECIFIED PREGNANCY RELATED CONDITIONS, UNSPECIFIED TRIMESTER: ICD-10-CM

## 2023-12-13 PROCEDURE — 99213 OFFICE O/P EST LOW 20 MIN: CPT | Mod: TH

## 2023-12-14 LAB
FERRITIN SERPL-MCNC: 92 NG/ML
GLUCOSE 1H P 50 G GLC PO SERPL-MCNC: 104 MG/DL
HCT VFR BLD CALC: 32.2 %
HGB BLD-MCNC: 9.6 G/DL
MCHC RBC-ENTMCNC: 25.9 PG
MCHC RBC-ENTMCNC: 29.8 GM/DL
MCV RBC AUTO: 86.8 FL
PLATELET # BLD AUTO: 351 K/UL
RBC # BLD: 3.71 M/UL
RBC # FLD: 21.1 %
WBC # FLD AUTO: 10 K/UL

## 2023-12-15 ENCOUNTER — APPOINTMENT (OUTPATIENT)
Dept: INFUSION THERAPY | Facility: CLINIC | Age: 28
End: 2023-12-15

## 2023-12-15 ENCOUNTER — OUTPATIENT (OUTPATIENT)
Dept: OUTPATIENT SERVICES | Facility: HOSPITAL | Age: 28
LOS: 1 days | End: 2023-12-15
Payer: COMMERCIAL

## 2023-12-15 VITALS
TEMPERATURE: 98 F | RESPIRATION RATE: 16 BRPM | DIASTOLIC BLOOD PRESSURE: 64 MMHG | WEIGHT: 153 LBS | HEART RATE: 87 BPM | HEIGHT: 64 IN | SYSTOLIC BLOOD PRESSURE: 110 MMHG | OXYGEN SATURATION: 99 %

## 2023-12-15 DIAGNOSIS — D50.9 IRON DEFICIENCY ANEMIA, UNSPECIFIED: ICD-10-CM

## 2023-12-15 LAB — T PALLIDUM AB SER QL IA: NEGATIVE

## 2023-12-15 PROCEDURE — 96365 THER/PROPH/DIAG IV INF INIT: CPT

## 2023-12-15 PROCEDURE — 96372 THER/PROPH/DIAG INJ SC/IM: CPT

## 2023-12-15 RX ORDER — PREGABALIN 225 MG/1
1000 CAPSULE ORAL ONCE
Refills: 0 | Status: COMPLETED | OUTPATIENT
Start: 2023-12-15 | End: 2023-12-15

## 2023-12-15 RX ORDER — IRON SUCROSE 20 MG/ML
200 INJECTION, SOLUTION INTRAVENOUS ONCE
Refills: 0 | Status: COMPLETED | OUTPATIENT
Start: 2023-12-15 | End: 2023-12-15

## 2023-12-15 RX ADMIN — IRON SUCROSE 200 MILLIGRAM(S): 20 INJECTION, SOLUTION INTRAVENOUS at 14:38

## 2023-12-15 RX ADMIN — PREGABALIN 1000 MICROGRAM(S): 225 CAPSULE ORAL at 13:38

## 2023-12-15 RX ADMIN — IRON SUCROSE 110 MILLIGRAM(S): 20 INJECTION, SOLUTION INTRAVENOUS at 13:35

## 2023-12-18 LAB — BACTERIA UR CULT: NORMAL

## 2023-12-20 ENCOUNTER — NON-APPOINTMENT (OUTPATIENT)
Age: 28
End: 2023-12-20

## 2023-12-21 ENCOUNTER — APPOINTMENT (OUTPATIENT)
Dept: ANTEPARTUM | Facility: CLINIC | Age: 28
End: 2023-12-21
Payer: MEDICAID

## 2023-12-21 ENCOUNTER — ASOB RESULT (OUTPATIENT)
Age: 28
End: 2023-12-21

## 2023-12-21 PROCEDURE — 76820 UMBILICAL ARTERY ECHO: CPT | Mod: 59

## 2023-12-21 PROCEDURE — 76816 OB US FOLLOW-UP PER FETUS: CPT

## 2023-12-21 PROCEDURE — 76819 FETAL BIOPHYS PROFIL W/O NST: CPT

## 2023-12-22 ENCOUNTER — OUTPATIENT (OUTPATIENT)
Dept: OUTPATIENT SERVICES | Facility: HOSPITAL | Age: 28
LOS: 1 days | End: 2023-12-22
Payer: COMMERCIAL

## 2023-12-22 ENCOUNTER — APPOINTMENT (OUTPATIENT)
Dept: INFUSION THERAPY | Facility: CLINIC | Age: 28
End: 2023-12-22

## 2023-12-22 VITALS
RESPIRATION RATE: 18 BRPM | DIASTOLIC BLOOD PRESSURE: 70 MMHG | HEART RATE: 90 BPM | TEMPERATURE: 98 F | SYSTOLIC BLOOD PRESSURE: 119 MMHG | OXYGEN SATURATION: 99 %

## 2023-12-22 VITALS
OXYGEN SATURATION: 99 % | DIASTOLIC BLOOD PRESSURE: 68 MMHG | HEART RATE: 100 BPM | RESPIRATION RATE: 18 BRPM | SYSTOLIC BLOOD PRESSURE: 124 MMHG | TEMPERATURE: 98 F

## 2023-12-22 DIAGNOSIS — O26.899 OTHER SPECIFIED PREGNANCY RELATED CONDITIONS, UNSPECIFIED TRIMESTER: ICD-10-CM

## 2023-12-22 DIAGNOSIS — Z98.890 OTHER SPECIFIED POSTPROCEDURAL STATES: Chronic | ICD-10-CM

## 2023-12-22 DIAGNOSIS — D50.9 IRON DEFICIENCY ANEMIA, UNSPECIFIED: ICD-10-CM

## 2023-12-22 LAB
BLD GP AB SCN SERPL QL: NEGATIVE — SIGNIFICANT CHANGE UP
BLD GP AB SCN SERPL QL: NEGATIVE — SIGNIFICANT CHANGE UP
RH IG SCN BLD-IMP: NEGATIVE — SIGNIFICANT CHANGE UP

## 2023-12-22 PROCEDURE — 86900 BLOOD TYPING SEROLOGIC ABO: CPT

## 2023-12-22 PROCEDURE — 86901 BLOOD TYPING SEROLOGIC RH(D): CPT

## 2023-12-22 PROCEDURE — 59025 FETAL NON-STRESS TEST: CPT

## 2023-12-22 PROCEDURE — 99214 OFFICE O/P EST MOD 30 MIN: CPT

## 2023-12-22 PROCEDURE — 96372 THER/PROPH/DIAG INJ SC/IM: CPT

## 2023-12-22 PROCEDURE — 86850 RBC ANTIBODY SCREEN: CPT

## 2023-12-22 PROCEDURE — 36415 COLL VENOUS BLD VENIPUNCTURE: CPT

## 2023-12-22 RX ORDER — PREGABALIN 225 MG/1
1000 CAPSULE ORAL ONCE
Refills: 0 | Status: COMPLETED | OUTPATIENT
Start: 2023-12-22 | End: 2023-12-22

## 2023-12-22 RX ADMIN — PREGABALIN 1000 MICROGRAM(S): 225 CAPSULE ORAL at 13:52

## 2023-12-22 NOTE — OB PROVIDER TRIAGE NOTE - NSHPPHYSICALEXAM_GEN_ALL_CORE
/68   General: comfortable appearing, no acute distress  Abdomen: soft, non-tender, gravid  TAUS: cephalic presentation, BPP 8/8, ADRIÁN 9    NST: 150 baseline, moderate variability, +accels, no decels  [place]: not kera /68   General: comfortable appearing, no acute distress  Abdomen: soft, non-tender, gravid  TAUS: cephalic presentation, BPP 8/8, ADRIÁN 9    NST: 150 baseline, moderate variability, +accels, no decels  [place]: not kera

## 2023-12-22 NOTE — OB RN TRIAGE NOTE - FALL HARM RISK - UNIVERSAL INTERVENTIONS
Bed in lowest position, wheels locked, appropriate side rails in place/Call bell, personal items and telephone in reach/Instruct patient to call for assistance before getting out of bed or chair/Non-slip footwear when patient is out of bed/[place] to call system/Physically safe environment - no spills, clutter or unnecessary equipment/Purposeful Proactive Rounding/Room/bathroom lighting operational, light cord in reach Bed in lowest position, wheels locked, appropriate side rails in place/Call bell, personal items and telephone in reach/Instruct patient to call for assistance before getting out of bed or chair/Non-slip footwear when patient is out of bed/[place] to call system/Physically safe environment - no spills, clutter or unnecessary equipment/Purposeful Proactive Rounding/Room/bathroom lighting operational, light cord in reach

## 2023-12-22 NOTE — OB PROVIDER TRIAGE NOTE - NS_OBGYNHISTORY_OBGYN_ALL_OB_FT
Ante  -spontaneous pregnancy  -nipt wnl  -anatomy scan wnl  -GCT performed two weeks ago  -no hypertensive disorders of pregnancy    Ob Hx:  VTOP D&C 2021    Gyn Hx: denies    PMHx: denies  PSHx: denies  Meds: PNV  Allergies: cyotec- lip swelling

## 2023-12-22 NOTE — OB PROVIDER TRIAGE NOTE - NSOBPROVIDERNOTE_OBGYN_ALL_OB_FT
27 yo  at 28w4d presents for Rhogam shot. No labor complaints, NST reactive and reassuring, BPP 8/8. Will give rhogam shot and d/c home. Return PTL precautions discussed, all questions answered, patient expressed understanding. Discussed with senior resident Dr. Greenberg, discussed with attending Dr. Guevara

## 2023-12-22 NOTE — OB PROVIDER TRIAGE NOTE - HISTORY OF PRESENT ILLNESS
27 yo  at 28w4d presents for rhogam shot. She denies leakage of fluid, vaginal bleeding, contractions. Endorses fetal movement.  29 yo  at 28w4d presents for rhogam shot. She denies leakage of fluid, vaginal bleeding, contractions. Endorses fetal movement.

## 2023-12-26 ENCOUNTER — APPOINTMENT (OUTPATIENT)
Dept: HEMATOLOGY ONCOLOGY | Facility: CLINIC | Age: 28
End: 2023-12-26

## 2023-12-26 DIAGNOSIS — Z3A.28 28 WEEKS GESTATION OF PREGNANCY: ICD-10-CM

## 2023-12-26 DIAGNOSIS — Z29.13 ENCOUNTER FOR PROPHYLACTIC RHO(D) IMMUNE GLOBULIN: ICD-10-CM

## 2023-12-26 PROBLEM — Z78.9 OTHER SPECIFIED HEALTH STATUS: Chronic | Status: ACTIVE | Noted: 2023-12-22

## 2023-12-29 ENCOUNTER — NON-APPOINTMENT (OUTPATIENT)
Age: 28
End: 2023-12-29

## 2023-12-29 ENCOUNTER — APPOINTMENT (OUTPATIENT)
Dept: INFUSION THERAPY | Facility: CLINIC | Age: 28
End: 2023-12-29

## 2024-01-03 ENCOUNTER — NON-APPOINTMENT (OUTPATIENT)
Age: 29
End: 2024-01-03

## 2024-01-05 ENCOUNTER — NON-APPOINTMENT (OUTPATIENT)
Age: 29
End: 2024-01-05

## 2024-01-05 ENCOUNTER — APPOINTMENT (OUTPATIENT)
Dept: OBGYN | Facility: CLINIC | Age: 29
End: 2024-01-05
Payer: MEDICAID

## 2024-01-05 VITALS
WEIGHT: 162 LBS | HEART RATE: 62 BPM | OXYGEN SATURATION: 98 % | HEIGHT: 64 IN | SYSTOLIC BLOOD PRESSURE: 120 MMHG | DIASTOLIC BLOOD PRESSURE: 60 MMHG | BODY MASS INDEX: 27.66 KG/M2

## 2024-01-05 PROCEDURE — 99213 OFFICE O/P EST LOW 20 MIN: CPT | Mod: TH

## 2024-01-08 ENCOUNTER — LABORATORY RESULT (OUTPATIENT)
Age: 29
End: 2024-01-08

## 2024-01-08 ENCOUNTER — OUTPATIENT (OUTPATIENT)
Dept: OUTPATIENT SERVICES | Facility: HOSPITAL | Age: 29
LOS: 1 days | End: 2024-01-08
Payer: COMMERCIAL

## 2024-01-08 ENCOUNTER — APPOINTMENT (OUTPATIENT)
Dept: HEMATOLOGY ONCOLOGY | Facility: CLINIC | Age: 29
End: 2024-01-08
Payer: MEDICAID

## 2024-01-08 ENCOUNTER — APPOINTMENT (OUTPATIENT)
Dept: INFUSION THERAPY | Facility: CLINIC | Age: 29
End: 2024-01-08

## 2024-01-08 VITALS
HEIGHT: 64 IN | WEIGHT: 162.04 LBS | BODY MASS INDEX: 27.66 KG/M2 | DIASTOLIC BLOOD PRESSURE: 74 MMHG | RESPIRATION RATE: 16 BRPM | OXYGEN SATURATION: 98 % | DIASTOLIC BLOOD PRESSURE: 74 MMHG | SYSTOLIC BLOOD PRESSURE: 115 MMHG | HEART RATE: 101 BPM | WEIGHT: 162 LBS | HEIGHT: 64 IN | HEART RATE: 100 BPM | SYSTOLIC BLOOD PRESSURE: 115 MMHG | TEMPERATURE: 98.2 F | OXYGEN SATURATION: 98 % | RESPIRATION RATE: 18 BRPM | TEMPERATURE: 98 F

## 2024-01-08 DIAGNOSIS — D50.9 IRON DEFICIENCY ANEMIA, UNSPECIFIED: ICD-10-CM

## 2024-01-08 DIAGNOSIS — Z98.890 OTHER SPECIFIED POSTPROCEDURAL STATES: Chronic | ICD-10-CM

## 2024-01-08 PROCEDURE — 96372 THER/PROPH/DIAG INJ SC/IM: CPT

## 2024-01-08 PROCEDURE — 99213 OFFICE O/P EST LOW 20 MIN: CPT

## 2024-01-08 RX ORDER — CYANOCOBALAMIN (VITAMIN B-12) 1000 MCG
1000 TABLET ORAL DAILY
Qty: 90 | Refills: 2 | Status: ACTIVE | COMMUNITY
Start: 2024-01-08 | End: 1900-01-01

## 2024-01-08 RX ORDER — PREGABALIN 225 MG/1
1000 CAPSULE ORAL ONCE
Refills: 0 | Status: COMPLETED | OUTPATIENT
Start: 2024-01-08 | End: 2024-01-08

## 2024-01-08 RX ADMIN — PREGABALIN 1000 MICROGRAM(S): 225 CAPSULE ORAL at 11:54

## 2024-01-08 NOTE — DISCHARGE INSTRUCTIONS: GENERAL THERAPY - NSFACILITYCONTAFTRHOUR_HEME_A_AMB
[facility] ([phone]) [facility] ([phone])

## 2024-01-08 NOTE — ASSESSMENT
[FreeTextEntry1] : 28-year-old pregnant female with anemia in setting of pregnancy.  Anemia-significantly improved on today's labs, hemoglobin is 10.7.  The patient is refusing p.o. iron supplementation. Iron level obtained, ferritin is now replete, iron saturation 9%. Recommend: IV Venofer x 2 more doses B12 1000 mcg p.o. daily Folic acid 1 mg p.o. daily Refer to GI for evaluation of possible malabsorption given B12, iron and folate deficiency concurrently  RTC in 3 months. Labs next visit: CBC, iron panel, B12, folate, reticulocyte

## 2024-01-08 NOTE — HISTORY OF PRESENT ILLNESS
[de-identified] : Radha Foster is a 28-year-old pregnant female (3rd trimester) who presents to the clinic for follow iron deficiency anemia and B12 deficiency.  Heme hx: No significant past medical history.  She transferred her care recently from [place] to [facility]  Currently pregnant around 11 weeks. Family history, no notable history of anemia in her family. Past medical history: None Past surgical history history: None Social history: No alcohol use, no smoking Meds: She takes prenatal vitamins Allergies: No known drug allergies  She recently had blood work done with her new gynecologist on 10/12 which showed hemoglobin of 9.2, MCV of 81.6, RDW of 16.4% and platelets of 408. At the time blood work also showed an iron level of 20, iron saturation of 4% and ferritin of 6. Her reticulocyte count was 1.6%-hypoproliferation likely due to iron deficiency.  She reports she never received blood transfusions before, and does not recall ever being told she is anemic.  She currently denies any bleeding.  No blood in her stool or urine. 1/8/24: Since her last visit she received 3 doses of IV Venofer and 3 doses of B12 injection.  She feels much better, her fatigue has improved.  She is taking folic acid every day. She is not taking p.o. iron because it makes her feel nauseous. [de-identified] : She denies any shortness of breath, chest pain, does report however occasional fatigue.  No blood in urine or stool.  No vaginal bleeding. She is following with obstetrics for her current pregnancy.

## 2024-01-10 LAB
FERRITIN SERPL-MCNC: 35 NG/ML
FOLATE SERPL-MCNC: >20 NG/ML
IRON SATN MFR SERPL: 9 %
IRON SERPL-MCNC: 38 UG/DL
RBC # BLD: 4 M/UL
RETICS # AUTO: 1.3 %
RETICS AGGREG/RBC NFR: 51.6 K/UL
TIBC SERPL-MCNC: 424 UG/DL
UIBC SERPL-MCNC: 386 UG/DL
VIT B12 SERPL-MCNC: 383 PG/ML

## 2024-01-16 ENCOUNTER — OUTPATIENT (OUTPATIENT)
Dept: OUTPATIENT SERVICES | Facility: HOSPITAL | Age: 29
LOS: 1 days | End: 2024-01-16
Payer: COMMERCIAL

## 2024-01-16 ENCOUNTER — APPOINTMENT (OUTPATIENT)
Dept: INFUSION THERAPY | Facility: CLINIC | Age: 29
End: 2024-01-16

## 2024-01-16 VITALS
HEART RATE: 98 BPM | OXYGEN SATURATION: 98 % | SYSTOLIC BLOOD PRESSURE: 112 MMHG | TEMPERATURE: 99 F | RESPIRATION RATE: 18 BRPM | DIASTOLIC BLOOD PRESSURE: 78 MMHG

## 2024-01-16 VITALS
HEIGHT: 64 IN | TEMPERATURE: 99 F | OXYGEN SATURATION: 97 % | SYSTOLIC BLOOD PRESSURE: 116 MMHG | DIASTOLIC BLOOD PRESSURE: 60 MMHG | RESPIRATION RATE: 18 BRPM | HEART RATE: 98 BPM | WEIGHT: 162.04 LBS

## 2024-01-16 DIAGNOSIS — Z98.890 OTHER SPECIFIED POSTPROCEDURAL STATES: Chronic | ICD-10-CM

## 2024-01-16 DIAGNOSIS — D50.9 IRON DEFICIENCY ANEMIA, UNSPECIFIED: ICD-10-CM

## 2024-01-16 PROCEDURE — 96365 THER/PROPH/DIAG IV INF INIT: CPT

## 2024-01-16 PROCEDURE — 96372 THER/PROPH/DIAG INJ SC/IM: CPT

## 2024-01-16 RX ORDER — IRON SUCROSE 20 MG/ML
200 INJECTION, SOLUTION INTRAVENOUS ONCE
Refills: 0 | Status: COMPLETED | OUTPATIENT
Start: 2024-01-16 | End: 2024-01-16

## 2024-01-16 RX ORDER — PREGABALIN 225 MG/1
1000 CAPSULE ORAL ONCE
Refills: 0 | Status: COMPLETED | OUTPATIENT
Start: 2024-01-16 | End: 2024-01-16

## 2024-01-16 RX ADMIN — IRON SUCROSE 110 MILLIGRAM(S): 20 INJECTION, SOLUTION INTRAVENOUS at 11:10

## 2024-01-16 RX ADMIN — IRON SUCROSE 200 MILLIGRAM(S): 20 INJECTION, SOLUTION INTRAVENOUS at 12:10

## 2024-01-16 RX ADMIN — PREGABALIN 1000 MICROGRAM(S): 225 CAPSULE ORAL at 11:42

## 2024-01-16 NOTE — DISCHARGE INSTRUCTIONS: GENERAL THERAPY - NSAPPTSFOLLOWUP_HEME_A_AMB
[facility]... [facility]...

## 2024-01-16 NOTE — DISCHARGE INSTRUCTIONS: GENERAL THERAPY - NSFACILITYCONTAFTRHOUR_HEME_A_AMB
[facility] ([phone]) [facility] ([phone])

## 2024-01-19 ENCOUNTER — APPOINTMENT (OUTPATIENT)
Dept: OBGYN | Facility: CLINIC | Age: 29
End: 2024-01-19
Payer: MEDICAID

## 2024-01-19 ENCOUNTER — NON-APPOINTMENT (OUTPATIENT)
Age: 29
End: 2024-01-19

## 2024-01-19 VITALS
HEART RATE: 94 BPM | OXYGEN SATURATION: 98 % | BODY MASS INDEX: 27.98 KG/M2 | SYSTOLIC BLOOD PRESSURE: 110 MMHG | DIASTOLIC BLOOD PRESSURE: 70 MMHG | WEIGHT: 163 LBS

## 2024-01-19 DIAGNOSIS — Z34.93 ENCOUNTER FOR SUPERVISION OF NORMAL PREGNANCY, UNSPECIFIED, THIRD TRIMESTER: ICD-10-CM

## 2024-01-19 PROCEDURE — 99213 OFFICE O/P EST LOW 20 MIN: CPT | Mod: TH

## 2024-01-23 ENCOUNTER — APPOINTMENT (OUTPATIENT)
Dept: INFUSION THERAPY | Facility: CLINIC | Age: 29
End: 2024-01-23

## 2024-01-23 ENCOUNTER — OUTPATIENT (OUTPATIENT)
Dept: OUTPATIENT SERVICES | Facility: HOSPITAL | Age: 29
LOS: 1 days | End: 2024-01-23
Payer: COMMERCIAL

## 2024-01-23 VITALS — TEMPERATURE: 98 F

## 2024-01-23 DIAGNOSIS — D50.9 IRON DEFICIENCY ANEMIA, UNSPECIFIED: ICD-10-CM

## 2024-01-23 DIAGNOSIS — Z98.890 OTHER SPECIFIED POSTPROCEDURAL STATES: Chronic | ICD-10-CM

## 2024-01-23 PROCEDURE — 96365 THER/PROPH/DIAG IV INF INIT: CPT

## 2024-01-23 RX ORDER — IRON SUCROSE 20 MG/ML
200 INJECTION, SOLUTION INTRAVENOUS ONCE
Refills: 0 | Status: COMPLETED | OUTPATIENT
Start: 2024-01-23 | End: 2024-01-23

## 2024-01-23 RX ADMIN — IRON SUCROSE 110 MILLIGRAM(S): 20 INJECTION, SOLUTION INTRAVENOUS at 16:25

## 2024-01-23 RX ADMIN — IRON SUCROSE 200 MILLIGRAM(S): 20 INJECTION, SOLUTION INTRAVENOUS at 17:20

## 2024-02-01 ENCOUNTER — APPOINTMENT (OUTPATIENT)
Dept: ANTEPARTUM | Facility: CLINIC | Age: 29
End: 2024-02-01
Payer: MEDICAID

## 2024-02-01 ENCOUNTER — ASOB RESULT (OUTPATIENT)
Age: 29
End: 2024-02-01

## 2024-02-01 PROCEDURE — 76820 UMBILICAL ARTERY ECHO: CPT | Mod: 59

## 2024-02-01 PROCEDURE — 76816 OB US FOLLOW-UP PER FETUS: CPT

## 2024-02-01 PROCEDURE — 76819 FETAL BIOPHYS PROFIL W/O NST: CPT

## 2024-02-02 ENCOUNTER — NON-APPOINTMENT (OUTPATIENT)
Age: 29
End: 2024-02-02

## 2024-02-02 ENCOUNTER — APPOINTMENT (OUTPATIENT)
Dept: OBGYN | Facility: CLINIC | Age: 29
End: 2024-02-02
Payer: MEDICAID

## 2024-02-02 VITALS
DIASTOLIC BLOOD PRESSURE: 70 MMHG | WEIGHT: 166 LBS | HEART RATE: 118 BPM | OXYGEN SATURATION: 98 % | BODY MASS INDEX: 28.49 KG/M2 | SYSTOLIC BLOOD PRESSURE: 110 MMHG

## 2024-02-02 DIAGNOSIS — O99.019 ANEMIA COMPLICATING PREGNANCY, UNSPECIFIED TRIMESTER: ICD-10-CM

## 2024-02-02 PROCEDURE — 99213 OFFICE O/P EST LOW 20 MIN: CPT | Mod: TH

## 2024-02-11 ENCOUNTER — OUTPATIENT (OUTPATIENT)
Dept: OUTPATIENT SERVICES | Facility: HOSPITAL | Age: 29
LOS: 1 days | End: 2024-02-11
Payer: COMMERCIAL

## 2024-02-11 VITALS
DIASTOLIC BLOOD PRESSURE: 61 MMHG | HEART RATE: 78 BPM | TEMPERATURE: 98 F | OXYGEN SATURATION: 100 % | RESPIRATION RATE: 18 BRPM | SYSTOLIC BLOOD PRESSURE: 121 MMHG

## 2024-02-11 DIAGNOSIS — O26.899 OTHER SPECIFIED PREGNANCY RELATED CONDITIONS, UNSPECIFIED TRIMESTER: ICD-10-CM

## 2024-02-11 DIAGNOSIS — Z98.890 OTHER SPECIFIED POSTPROCEDURAL STATES: Chronic | ICD-10-CM

## 2024-02-11 LAB
BASOPHILS # BLD AUTO: 0.02 K/UL — SIGNIFICANT CHANGE UP (ref 0–0.2)
BASOPHILS NFR BLD AUTO: 0.2 % — SIGNIFICANT CHANGE UP (ref 0–2)
BLD GP AB SCN SERPL QL: NEGATIVE — SIGNIFICANT CHANGE UP
EOSINOPHIL # BLD AUTO: 0.04 K/UL — SIGNIFICANT CHANGE UP (ref 0–0.5)
EOSINOPHIL NFR BLD AUTO: 0.5 % — SIGNIFICANT CHANGE UP (ref 0–6)
FIBRINOGEN PPP-MCNC: 532 MG/DL — HIGH (ref 200–445)
HCT VFR BLD CALC: 33.4 % — LOW (ref 34.5–45)
HGB BLD-MCNC: 11.3 G/DL — LOW (ref 11.5–15.5)
IMM GRANULOCYTES NFR BLD AUTO: 0.9 % — SIGNIFICANT CHANGE UP (ref 0–0.9)
LYMPHOCYTES # BLD AUTO: 2.45 K/UL — SIGNIFICANT CHANGE UP (ref 1–3.3)
LYMPHOCYTES # BLD AUTO: 28.7 % — SIGNIFICANT CHANGE UP (ref 13–44)
MCHC RBC-ENTMCNC: 28.5 PG — SIGNIFICANT CHANGE UP (ref 27–34)
MCHC RBC-ENTMCNC: 33.8 GM/DL — SIGNIFICANT CHANGE UP (ref 32–36)
MCV RBC AUTO: 84.1 FL — SIGNIFICANT CHANGE UP (ref 80–100)
MONOCYTES # BLD AUTO: 0.62 K/UL — SIGNIFICANT CHANGE UP (ref 0–0.9)
MONOCYTES NFR BLD AUTO: 7.3 % — SIGNIFICANT CHANGE UP (ref 2–14)
NEUTROPHILS # BLD AUTO: 5.34 K/UL — SIGNIFICANT CHANGE UP (ref 1.8–7.4)
NEUTROPHILS NFR BLD AUTO: 62.4 % — SIGNIFICANT CHANGE UP (ref 43–77)
NRBC # BLD: 0 /100 WBCS — SIGNIFICANT CHANGE UP (ref 0–0)
PLATELET # BLD AUTO: 278 K/UL — SIGNIFICANT CHANGE UP (ref 150–400)
RBC # BLD: 3.97 M/UL — SIGNIFICANT CHANGE UP (ref 3.8–5.2)
RBC # FLD: 19.8 % — HIGH (ref 10.3–14.5)
RH IG SCN BLD-IMP: NEGATIVE — SIGNIFICANT CHANGE UP
WBC # BLD: 8.55 K/UL — SIGNIFICANT CHANGE UP (ref 3.8–10.5)
WBC # FLD AUTO: 8.55 K/UL — SIGNIFICANT CHANGE UP (ref 3.8–10.5)

## 2024-02-11 PROCEDURE — 36415 COLL VENOUS BLD VENIPUNCTURE: CPT

## 2024-02-11 PROCEDURE — 85025 COMPLETE CBC W/AUTO DIFF WBC: CPT

## 2024-02-11 PROCEDURE — 86901 BLOOD TYPING SEROLOGIC RH(D): CPT

## 2024-02-11 PROCEDURE — 86900 BLOOD TYPING SEROLOGIC ABO: CPT

## 2024-02-11 PROCEDURE — 59025 FETAL NON-STRESS TEST: CPT

## 2024-02-11 PROCEDURE — 96372 THER/PROPH/DIAG INJ SC/IM: CPT

## 2024-02-11 PROCEDURE — 85384 FIBRINOGEN ACTIVITY: CPT

## 2024-02-11 PROCEDURE — 86850 RBC ANTIBODY SCREEN: CPT

## 2024-02-11 PROCEDURE — 99214 OFFICE O/P EST MOD 30 MIN: CPT

## 2024-02-11 NOTE — OB RN TRIAGE NOTE - FALL HARM RISK - UNIVERSAL INTERVENTIONS
Bed in lowest position, wheels locked, appropriate side rails in place/Call bell, personal items and telephone in reach/Instruct patient to call for assistance before getting out of bed or chair/Non-slip footwear when patient is out of bed/[place] to call system/Physically safe environment - no spills, clutter or unnecessary equipment/Purposeful Proactive Rounding/Room/bathroom lighting operational, light cord in reach
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
## 2024-02-11 NOTE — OB RN TRIAGE NOTE - LIVING CHILDREN, OB PROFILE
Quitting Tobacco: Care Instructions  Quitting tobacco is much harder than simply changing a habit. Nicotine cravings make it hard to quit, but you can do it. Your doctor will help you set up the plan that best meets your needs. You will miss the nicotine at first. You may feel short-tempered and grumpy. You may have trouble sleeping or thinking clearly. The urge to use tobacco may continue for a time. Combining tools can raise your chances of success. You can use medicine along with counseling. And you can join a quit-tobacco program, such as the American Lung Association's Freedom from Smoking program.     Get support. Reach out to family and friends, and find a counselor to help you quit. Join a support group, such as Nicotine Anonymous. Go to www.smokefree. gov to sign up for text messaging support. Talk to your doctor or pharmacist about medicines that can help you quit. Medicines can help with cravings and withdrawal symptoms. There are several over-the-counter choices, such as nicotine patches, gum, and lozenges. After you quit, do not use tobacco again, not even once. Get rid of all tobacco products and anything that reminds you of tobacco, such as ashtrays. Avoid things that make you reach for tobacco.  Change your daily routine. Take a different route to work, or eat a meal in a different place. Try to cut down on stress. Find ways to calm yourself, such as taking a hot bath or doing deep breathing exercises. Eat a healthy diet, and get regular exercise. Having healthy habits may help you quit using tobacco.     Don't give up on quitting if you use tobacco again. Most people quit and restart a few times before they quit for good. Follow-up care is a key part of your treatment and safety. Be sure to make and go to all appointments, and call your doctor if you are having problems.  It's also a good idea to know your test results and keep a list of the medicines you 0

## 2024-02-13 DIAGNOSIS — Z04.3 ENCOUNTER FOR EXAMINATION AND OBSERVATION FOLLOWING OTHER ACCIDENT: ICD-10-CM

## 2024-02-13 DIAGNOSIS — Z3A.35 35 WEEKS GESTATION OF PREGNANCY: ICD-10-CM

## 2024-02-13 DIAGNOSIS — Z67.91 UNSPECIFIED BLOOD TYPE, RH NEGATIVE: ICD-10-CM

## 2024-02-13 DIAGNOSIS — O99.013 ANEMIA COMPLICATING PREGNANCY, THIRD TRIMESTER: ICD-10-CM

## 2024-02-13 DIAGNOSIS — D64.9 ANEMIA, UNSPECIFIED: ICD-10-CM

## 2024-02-16 ENCOUNTER — NON-APPOINTMENT (OUTPATIENT)
Age: 29
End: 2024-02-16

## 2024-02-16 ENCOUNTER — APPOINTMENT (OUTPATIENT)
Dept: OBGYN | Facility: CLINIC | Age: 29
End: 2024-02-16
Payer: MEDICAID

## 2024-02-16 VITALS
BODY MASS INDEX: 29.18 KG/M2 | WEIGHT: 170 LBS | SYSTOLIC BLOOD PRESSURE: 110 MMHG | OXYGEN SATURATION: 99 % | DIASTOLIC BLOOD PRESSURE: 70 MMHG | HEART RATE: 76 BPM

## 2024-02-16 DIAGNOSIS — Z34.90 ENCOUNTER FOR SUPERVISION OF NORMAL PREGNANCY, UNSPECIFIED, UNSPECIFIED TRIMESTER: ICD-10-CM

## 2024-02-16 PROCEDURE — 99213 OFFICE O/P EST LOW 20 MIN: CPT | Mod: TH

## 2024-02-20 ENCOUNTER — NON-APPOINTMENT (OUTPATIENT)
Age: 29
End: 2024-02-20

## 2024-02-20 ENCOUNTER — APPOINTMENT (OUTPATIENT)
Dept: OBGYN | Facility: CLINIC | Age: 29
End: 2024-02-20
Payer: MEDICAID

## 2024-02-20 VITALS
HEART RATE: 68 BPM | SYSTOLIC BLOOD PRESSURE: 110 MMHG | DIASTOLIC BLOOD PRESSURE: 70 MMHG | BODY MASS INDEX: 28.84 KG/M2 | WEIGHT: 168 LBS | OXYGEN SATURATION: 98 %

## 2024-02-20 LAB
GP B STREP DNA SPEC QL NAA+PROBE: NOT DETECTED
HBV SURFACE AB SER QL: REACTIVE
HCV AB SER QL: NONREACTIVE
HCV S/CO RATIO: 0.16 S/CO
HIV1+2 AB SPEC QL IA.RAPID: NONREACTIVE
SOURCE GBS: NORMAL
T PALLIDUM AB SER QL IA: NEGATIVE

## 2024-02-20 PROCEDURE — 99213 OFFICE O/P EST LOW 20 MIN: CPT | Mod: TH

## 2024-02-27 ENCOUNTER — APPOINTMENT (OUTPATIENT)
Dept: OBGYN | Facility: CLINIC | Age: 29
End: 2024-02-27
Payer: MEDICAID

## 2024-02-27 VITALS
DIASTOLIC BLOOD PRESSURE: 60 MMHG | WEIGHT: 170 LBS | SYSTOLIC BLOOD PRESSURE: 118 MMHG | OXYGEN SATURATION: 96 % | BODY MASS INDEX: 29.18 KG/M2

## 2024-02-27 PROCEDURE — 99213 OFFICE O/P EST LOW 20 MIN: CPT | Mod: TH

## 2024-03-02 ENCOUNTER — INPATIENT (INPATIENT)
Facility: HOSPITAL | Age: 29
LOS: 1 days | Discharge: HOME | End: 2024-03-04
Attending: OBSTETRICS & GYNECOLOGY | Admitting: OBSTETRICS & GYNECOLOGY
Payer: COMMERCIAL

## 2024-03-02 ENCOUNTER — RESULT REVIEW (OUTPATIENT)
Age: 29
End: 2024-03-02

## 2024-03-02 VITALS
RESPIRATION RATE: 20 BRPM | SYSTOLIC BLOOD PRESSURE: 121 MMHG | DIASTOLIC BLOOD PRESSURE: 74 MMHG | HEART RATE: 99 BPM | OXYGEN SATURATION: 99 % | TEMPERATURE: 98 F

## 2024-03-02 DIAGNOSIS — O26.899 OTHER SPECIFIED PREGNANCY RELATED CONDITIONS, UNSPECIFIED TRIMESTER: ICD-10-CM

## 2024-03-02 DIAGNOSIS — Z98.890 OTHER SPECIFIED POSTPROCEDURAL STATES: Chronic | ICD-10-CM

## 2024-03-02 LAB
ALBUMIN SERPL ELPH-MCNC: 3 G/DL — LOW (ref 3.3–5)
ALP SERPL-CCNC: 114 U/L — SIGNIFICANT CHANGE UP (ref 40–120)
ALT FLD-CCNC: 8 U/L — LOW (ref 10–45)
ANION GAP SERPL CALC-SCNC: 13 MMOL/L — SIGNIFICANT CHANGE UP (ref 5–17)
AST SERPL-CCNC: 16 U/L — SIGNIFICANT CHANGE UP (ref 10–40)
BASOPHILS # BLD AUTO: 0.03 K/UL — SIGNIFICANT CHANGE UP (ref 0–0.2)
BASOPHILS NFR BLD AUTO: 0.2 % — SIGNIFICANT CHANGE UP (ref 0–2)
BILIRUB SERPL-MCNC: 0.3 MG/DL — SIGNIFICANT CHANGE UP (ref 0.2–1.2)
BLD GP AB SCN SERPL QL: POSITIVE — SIGNIFICANT CHANGE UP
BUN SERPL-MCNC: 8 MG/DL — SIGNIFICANT CHANGE UP (ref 7–23)
CALCIUM SERPL-MCNC: 8.7 MG/DL — SIGNIFICANT CHANGE UP (ref 8.4–10.5)
CHLORIDE SERPL-SCNC: 103 MMOL/L — SIGNIFICANT CHANGE UP (ref 96–108)
CO2 SERPL-SCNC: 18 MMOL/L — LOW (ref 22–31)
CREAT SERPL-MCNC: 0.68 MG/DL — SIGNIFICANT CHANGE UP (ref 0.5–1.3)
EGFR: 121 ML/MIN/1.73M2 — SIGNIFICANT CHANGE UP
EOSINOPHIL # BLD AUTO: 0 K/UL — SIGNIFICANT CHANGE UP (ref 0–0.5)
EOSINOPHIL NFR BLD AUTO: 0 % — SIGNIFICANT CHANGE UP (ref 0–6)
FIBRINOGEN PPP-MCNC: 415 MG/DL — SIGNIFICANT CHANGE UP (ref 200–445)
GLUCOSE SERPL-MCNC: 94 MG/DL — SIGNIFICANT CHANGE UP (ref 70–99)
HBV SURFACE AG SER-ACNC: SIGNIFICANT CHANGE UP
HCT VFR BLD CALC: 36.3 % — SIGNIFICANT CHANGE UP (ref 34.5–45)
HGB BLD-MCNC: 12.6 G/DL — SIGNIFICANT CHANGE UP (ref 11.5–15.5)
IMM GRANULOCYTES NFR BLD AUTO: 0.6 % — SIGNIFICANT CHANGE UP (ref 0–0.9)
LDH SERPL L TO P-CCNC: 210 U/L — SIGNIFICANT CHANGE UP (ref 50–242)
LYMPHOCYTES # BLD AUTO: 1.54 K/UL — SIGNIFICANT CHANGE UP (ref 1–3.3)
LYMPHOCYTES # BLD AUTO: 11 % — LOW (ref 13–44)
MCHC RBC-ENTMCNC: 29.2 PG — SIGNIFICANT CHANGE UP (ref 27–34)
MCHC RBC-ENTMCNC: 34.7 GM/DL — SIGNIFICANT CHANGE UP (ref 32–36)
MCV RBC AUTO: 84 FL — SIGNIFICANT CHANGE UP (ref 80–100)
MONOCYTES # BLD AUTO: 0.52 K/UL — SIGNIFICANT CHANGE UP (ref 0–0.9)
MONOCYTES NFR BLD AUTO: 3.7 % — SIGNIFICANT CHANGE UP (ref 2–14)
NEUTROPHILS # BLD AUTO: 11.88 K/UL — HIGH (ref 1.8–7.4)
NEUTROPHILS NFR BLD AUTO: 84.5 % — HIGH (ref 43–77)
NRBC # BLD: 0 /100 WBCS — SIGNIFICANT CHANGE UP (ref 0–0)
PLATELET # BLD AUTO: 277 K/UL — SIGNIFICANT CHANGE UP (ref 150–400)
POTASSIUM SERPL-MCNC: 3.5 MMOL/L — SIGNIFICANT CHANGE UP (ref 3.5–5.3)
POTASSIUM SERPL-SCNC: 3.5 MMOL/L — SIGNIFICANT CHANGE UP (ref 3.5–5.3)
PROT SERPL-MCNC: 5.6 G/DL — LOW (ref 6–8.3)
RBC # BLD: 4.32 M/UL — SIGNIFICANT CHANGE UP (ref 3.8–5.2)
RBC # FLD: 17.2 % — HIGH (ref 10.3–14.5)
RH IG SCN BLD-IMP: NEGATIVE — SIGNIFICANT CHANGE UP
RUBV IGG SER-ACNC: 0.8 INDEX — SIGNIFICANT CHANGE UP
RUBV IGG SER-IMP: NEGATIVE — SIGNIFICANT CHANGE UP
SODIUM SERPL-SCNC: 134 MMOL/L — LOW (ref 135–145)
T PALLIDUM AB TITR SER: NEGATIVE — SIGNIFICANT CHANGE UP
URATE SERPL-MCNC: 4.7 MG/DL — SIGNIFICANT CHANGE UP (ref 2.5–7)
WBC # BLD: 14.06 K/UL — HIGH (ref 3.8–10.5)
WBC # FLD AUTO: 14.06 K/UL — HIGH (ref 3.8–10.5)

## 2024-03-02 PROCEDURE — 86077 PHYS BLOOD BANK SERV XMATCH: CPT

## 2024-03-02 PROCEDURE — 59409 OBSTETRICAL CARE: CPT | Mod: U7,GC

## 2024-03-02 PROCEDURE — 88307 TISSUE EXAM BY PATHOLOGIST: CPT | Mod: 26

## 2024-03-02 RX ORDER — PRAMOXINE HYDROCHLORIDE 150 MG/15G
1 AEROSOL, FOAM RECTAL EVERY 4 HOURS
Refills: 0 | Status: DISCONTINUED | OUTPATIENT
Start: 2024-03-02 | End: 2024-03-04

## 2024-03-02 RX ORDER — IBUPROFEN 200 MG
600 TABLET ORAL EVERY 6 HOURS
Refills: 0 | Status: COMPLETED | OUTPATIENT
Start: 2024-03-02 | End: 2025-01-29

## 2024-03-02 RX ORDER — ACETAMINOPHEN 500 MG
975 TABLET ORAL
Refills: 0 | Status: DISCONTINUED | OUTPATIENT
Start: 2024-03-02 | End: 2024-03-04

## 2024-03-02 RX ORDER — ACETAMINOPHEN 500 MG
1000 TABLET ORAL ONCE
Refills: 0 | Status: COMPLETED | OUTPATIENT
Start: 2024-03-02 | End: 2024-03-02

## 2024-03-02 RX ORDER — MAGNESIUM HYDROXIDE 400 MG/1
30 TABLET, CHEWABLE ORAL
Refills: 0 | Status: DISCONTINUED | OUTPATIENT
Start: 2024-03-02 | End: 2024-03-04

## 2024-03-02 RX ORDER — CHLORHEXIDINE GLUCONATE 213 G/1000ML
1 SOLUTION TOPICAL DAILY
Refills: 0 | Status: DISCONTINUED | OUTPATIENT
Start: 2024-03-02 | End: 2024-03-02

## 2024-03-02 RX ORDER — IBUPROFEN 200 MG
600 TABLET ORAL EVERY 6 HOURS
Refills: 0 | Status: DISCONTINUED | OUTPATIENT
Start: 2024-03-02 | End: 2024-03-04

## 2024-03-02 RX ORDER — FENTANYL/BUPIVACAINE/NS/PF 2MCG/ML-.1
250 PLASTIC BAG, INJECTION (ML) INJECTION
Refills: 0 | Status: DISCONTINUED | OUTPATIENT
Start: 2024-03-02 | End: 2024-03-02

## 2024-03-02 RX ORDER — DIPHENHYDRAMINE HCL 50 MG
25 CAPSULE ORAL EVERY 6 HOURS
Refills: 0 | Status: DISCONTINUED | OUTPATIENT
Start: 2024-03-02 | End: 2024-03-04

## 2024-03-02 RX ORDER — BENZOCAINE 10 %
1 GEL (GRAM) MUCOUS MEMBRANE EVERY 6 HOURS
Refills: 0 | Status: DISCONTINUED | OUTPATIENT
Start: 2024-03-02 | End: 2024-03-04

## 2024-03-02 RX ORDER — HYDROCORTISONE 1 %
1 OINTMENT (GRAM) TOPICAL EVERY 6 HOURS
Refills: 0 | Status: DISCONTINUED | OUTPATIENT
Start: 2024-03-02 | End: 2024-03-04

## 2024-03-02 RX ORDER — OXYTOCIN 10 UNIT/ML
333.33 VIAL (ML) INJECTION
Qty: 20 | Refills: 0 | Status: DISCONTINUED | OUTPATIENT
Start: 2024-03-02 | End: 2024-03-02

## 2024-03-02 RX ORDER — KETOROLAC TROMETHAMINE 30 MG/ML
30 SYRINGE (ML) INJECTION ONCE
Refills: 0 | Status: DISCONTINUED | OUTPATIENT
Start: 2024-03-02 | End: 2024-03-02

## 2024-03-02 RX ORDER — AER TRAVELER 0.5 G/1
1 SOLUTION RECTAL; TOPICAL EVERY 4 HOURS
Refills: 0 | Status: DISCONTINUED | OUTPATIENT
Start: 2024-03-02 | End: 2024-03-04

## 2024-03-02 RX ORDER — OXYTOCIN 10 UNIT/ML
VIAL (ML) INJECTION
Qty: 30 | Refills: 0 | Status: DISCONTINUED | OUTPATIENT
Start: 2024-03-02 | End: 2024-03-02

## 2024-03-02 RX ORDER — LANOLIN
1 OINTMENT (GRAM) TOPICAL EVERY 6 HOURS
Refills: 0 | Status: DISCONTINUED | OUTPATIENT
Start: 2024-03-02 | End: 2024-03-04

## 2024-03-02 RX ORDER — SODIUM CHLORIDE 9 MG/ML
1000 INJECTION, SOLUTION INTRAVENOUS
Refills: 0 | Status: DISCONTINUED | OUTPATIENT
Start: 2024-03-02 | End: 2024-03-02

## 2024-03-02 RX ORDER — OXYTOCIN 10 UNIT/ML
41.67 VIAL (ML) INJECTION
Qty: 20 | Refills: 0 | Status: DISCONTINUED | OUTPATIENT
Start: 2024-03-02 | End: 2024-03-04

## 2024-03-02 RX ORDER — DIBUCAINE 1 %
1 OINTMENT (GRAM) RECTAL EVERY 6 HOURS
Refills: 0 | Status: DISCONTINUED | OUTPATIENT
Start: 2024-03-02 | End: 2024-03-04

## 2024-03-02 RX ORDER — SIMETHICONE 80 MG/1
80 TABLET, CHEWABLE ORAL EVERY 4 HOURS
Refills: 0 | Status: DISCONTINUED | OUTPATIENT
Start: 2024-03-02 | End: 2024-03-04

## 2024-03-02 RX ORDER — TETANUS TOXOID, REDUCED DIPHTHERIA TOXOID AND ACELLULAR PERTUSSIS VACCINE, ADSORBED 5; 2.5; 8; 8; 2.5 [IU]/.5ML; [IU]/.5ML; UG/.5ML; UG/.5ML; UG/.5ML
0.5 SUSPENSION INTRAMUSCULAR ONCE
Refills: 0 | Status: DISCONTINUED | OUTPATIENT
Start: 2024-03-02 | End: 2024-03-04

## 2024-03-02 RX ORDER — ONDANSETRON 8 MG/1
8 TABLET, FILM COATED ORAL ONCE
Refills: 0 | Status: COMPLETED | OUTPATIENT
Start: 2024-03-02 | End: 2024-03-02

## 2024-03-02 RX ORDER — OXYCODONE HYDROCHLORIDE 5 MG/1
5 TABLET ORAL
Refills: 0 | Status: DISCONTINUED | OUTPATIENT
Start: 2024-03-02 | End: 2024-03-04

## 2024-03-02 RX ORDER — SODIUM CHLORIDE 9 MG/ML
3 INJECTION INTRAMUSCULAR; INTRAVENOUS; SUBCUTANEOUS EVERY 8 HOURS
Refills: 0 | Status: DISCONTINUED | OUTPATIENT
Start: 2024-03-02 | End: 2024-03-04

## 2024-03-02 RX ORDER — CITRIC ACID/SODIUM CITRATE 300-500 MG
15 SOLUTION, ORAL ORAL EVERY 6 HOURS
Refills: 0 | Status: DISCONTINUED | OUTPATIENT
Start: 2024-03-02 | End: 2024-03-02

## 2024-03-02 RX ADMIN — SODIUM CHLORIDE 3 MILLILITER(S): 9 INJECTION INTRAMUSCULAR; INTRAVENOUS; SUBCUTANEOUS at 22:38

## 2024-03-02 RX ADMIN — SODIUM CHLORIDE 125 MILLILITER(S): 9 INJECTION, SOLUTION INTRAVENOUS at 02:03

## 2024-03-02 RX ADMIN — Medication 2 MILLIUNIT(S)/MIN: at 05:23

## 2024-03-02 RX ADMIN — Medication 30 MILLIGRAM(S): at 17:36

## 2024-03-02 RX ADMIN — Medication 975 MILLIGRAM(S): at 21:57

## 2024-03-02 RX ADMIN — Medication 975 MILLIGRAM(S): at 22:50

## 2024-03-02 RX ADMIN — ONDANSETRON 8 MILLIGRAM(S): 8 TABLET, FILM COATED ORAL at 12:45

## 2024-03-02 RX ADMIN — Medication 400 MILLIGRAM(S): at 15:11

## 2024-03-02 RX ADMIN — Medication 600 MILLIGRAM(S): at 23:53

## 2024-03-02 NOTE — OB PROVIDER LABOR PROGRESS NOTE - ASSESSMENT
category I tracing plan to start pitocin in 30 minutes
- Cat I FHT  - S/p barrios balloon  - Epidural in situ  - Pit at 14mu, cont as tolerated  - Cont current management
- Cat I FHT at this time  - S/p barrios  - Pitocin paused  - Epidural in situ  - D/w Dr. Padilla
category I
Pt seen at bedside. She is feeling more pain with contractions including lower back pain but denies vaginal or rectal pressure.     FHT: baseline 150, moderate variability, no accels, no decels  TOCO: ctx q3min  cat I     - pitocin at 2mu  - mild range BP possibly due to pain, will send full labs   - epidural in situ   - will start pushing soon  - continue to monitor 
Pt seen at bedside. SVE anterior lip, not reducible, 0 station. Pt comfortable with epidural however feeling nauseas. She denies rectal pressure.    FHT: baseline 140, moderate variability, +accels, one decel with VE  TOCO: ctx not picking up well, q2-3min   cat II, overall reassuring with moderate variability    - reposition to throne  - anticipate    - epidural in situ   - zofran 4mg ordered   - pitocin at 2mu  - continue to monitor 
category I tracing  pitocin at 14mU

## 2024-03-02 NOTE — OB PROVIDER LABOR PROGRESS NOTE - NS_OBIHIFHRDETAILS_OBGYN_ALL_OB_FT
130 baseline, moderate variability, +accels, no decels
140 baseline, moderate variability, +accels, no decels
135 baseline, moderate variability, +accels, no decels

## 2024-03-02 NOTE — OB NEONATOLOGY/PEDIATRICIAN DELIVERY SUMMARY - NSPEDSNEONOTESA_OBGYN_ALL_OB_FT
with vacuum extraction. Infant born with good tone, cried with tactile stim. Suctioned for large amount of mec stained fluid. Noted to have a anterior cephalhematoma. PE otherwise wnl's. Remains pink in r/a. PE wnl's. Condition stable.

## 2024-03-02 NOTE — PRE-ANESTHESIA EVALUATION ADULT - NSANTHPMHFT_GEN_ALL_CORE
29F  at 38.4 wks gestation presenting in labor at term. Pt requesting epidural analgesia 29F  at 38.4 wks gestation PMH ANDREZ (5 iron transfusions during this pregnancy) presenting in labor at term. Pt requesting epidural analgesia  PSH: D+C

## 2024-03-02 NOTE — OB PROVIDER DELIVERY SUMMARY - NSSELHIDDEN_OBGYN_ALL_OB_FT
[NS_DeliveryAttending1_OBGYN_ALL_OB_FT:[id number]],[NS_DeliveryAssist1_OBGYN_ALL_OB_FT:[id number]],[NS_DeliveryAssist2_OBGYN_ALL_OB_FT:[id number]],[NS_DeliveryRN_OBGYN_ALL_OB_FT:[id number]]

## 2024-03-02 NOTE — LACTATION INITIAL EVALUATION - NS LACT CON REASON FOR REQ
Dyad seen at 7hrs post birth. Mom states that infant have not yet latched since birth. Demonstrate hand expressing colostrum, bilateral copious expressible colostrum present. Place infant on Rt breast with football hold, infant latched with widely open flanged mouth and sucking sustained without nipple pain. Discuss daily expected adequate feeding signs on exclusively breastfeeding and how to monitor output. Mom comfortable with latching and positioning. Consult discussed with primary RN/premature infant

## 2024-03-02 NOTE — OB PROVIDER H&P - NSLOWPPHRISK_OBGYN_A_OB
No previous uterine incision/Man Pregnancy/Less than or equal to 4 previous vaginal births/No known bleeding disorder/No history of postpartum hemorrhage/No other PPH risks indicated

## 2024-03-02 NOTE — OB PROVIDER H&P - NS_BABIESUTERO_OBGYN_ALL_OB_NU
20 min on phone with daughter Brooklynn discussing prognosis and plan of care  she is reassured by small improvements in interactivity and in improving wbc/sodium  counselled overall poor prognosis in setting of brain mass with plan in place to avoid biopsy and definitive diagnosis  eventually hospice will need to be discussed in more detail - rapport building  f/u palliative 1 spoke on phone with daughter Brooklynn discussing prognosis and plan of care including new c auris fungemia  she asks we continue trying our best to get him stabilized  counselled overall poor prognosis in setting of brain mass with plan in place to avoid biopsy and definitive diagnosis  sacral decub with multiple resistant infections portending dismal prognosis  eventually hospice will need to be discussed in more detail - rapport building  f/u palliative

## 2024-03-02 NOTE — OB RN DELIVERY SUMMARY - NSSELHIDDEN_OBGYN_ALL_OB_FT
[NS_DeliveryAttending1_OBGYN_ALL_OB_FT:[id number]],[NS_DeliveryAssist1_OBGYN_ALL_OB_FT:[id number]],[NS_DeliveryAssist2_OBGYN_ALL_OB_FT:[id number]],[NS_DeliveryRN_OBGYN_ALL_OB_FT:[id number]]

## 2024-03-02 NOTE — OB RN DELIVERY SUMMARY - NS_SEPSISRSKCALC_OBGYN_ALL_OB_FT
EOS calculated successfully. EOS Risk Factor: 0.5/1000 live births ([facility] national incidence); GA=38w4d; Temp=99.7; ROM=8.7; GBS='Negative'; Antibiotics='No antibiotics or any antibiotics < 2 hrs prior to birth'   EOS calculated successfully. EOS Risk Factor: 0.5/1000 live births ([facility] national incidence); GA=38w4d; Temp=101.3; ROM=8.7; GBS='Negative'; Antibiotics='No antibiotics or any antibiotics < 2 hrs prior to birth'

## 2024-03-02 NOTE — OB PROVIDER LABOR PROGRESS NOTE - NS_SUBJECTIVE/OBJECTIVE_OBGYN_ALL_OB_FT
Assuming care of pt for day team. FHT reviewed. Baseline 140, mod variability, +accels, -decels. Ctx q2-3min
Note entry delayed 2/2 pt care. Pt seen at bedside at ~0915 for recurrent variable decelerations. SVE 6/80/-1. When positioning for exam, pt w/ prolonged deceleration to cely of 90. Pitocin paused. Pt repositioned L lateral/R lateral/all fours. FSE placed. Roge Chambers and Randy at bedside. Return to baseline. Will observe for prolonged reactive/reassuring tracing prior to restarting pitocin. At this time FHT reviewed. Baseline 150, mod variability, -accels, -decels. Ctx q3-5min
s/p barrios balloon  do not feel bag ?SROM  SVE 5/80/-1
tracing notes
barrios balloon placed  SVE 2/70/-1

## 2024-03-02 NOTE — OB PROVIDER H&P - HISTORY OF PRESENT ILLNESS
29y  at 38w4d presents in early labor requesting epidura. She denies leakage of fluid, vaginal bleeding. Endorses fetal movement.      Ante:  -spontaneous pregnancy  -NIPT/anatomy wnl  -GCT wnl  -no hypertensive disorders of pregnancy  -GBS negative  -EFW 3300g    Ob Hx: G1 VTOP cytotec and D&C  G2 current    Gyn Hx: denies    PMHx: iron deficiency anemia s/p 5 IV iron transfusions this pregnancy, patient advised to follow up with GI due to B12 deficiency     PSHx: D&C    Meds: PNV, iron, folic acid, B12    Allergies: Cytotec angioedema and hives    Physical Exam:  /74   General: comfortable appearing  Abdomen: soft, non-tender, gravid  TAUS: cephalic presentation  SVE: /-1    A/P  29y  at 38w4d presents in early labor  -admit to labor and delivery  -augment with barrios balloon and pitocin  -GBS negative  -epidural PRN  -consents signed, all questions answered, patient expressed understanding  -discussed with senior resident Dr. Brown  -discussed with attending Dr. Padilla 29y  at 38w4d presents in early labor requesting epidura. She denies leakage of fluid, vaginal bleeding. Endorses fetal movement.      Ante:  -spontaneous pregnancy  -NIPT/anatomy wnl  -GCT wnl  -no hypertensive disorders of pregnancy  -GBS negative  -EFW 3300g    Ob Hx: G1 VTOP cytotec and D&C  G2 current    Gyn Hx: denies    PMHx: iron deficiency anemia s/p 5 IV iron transfusions this pregnancy, patient advised to follow up with GI due to B12 deficiency     PSHx: D&C    Meds: PNV, iron, folic acid, B12    Allergies: Cytotec angioedema and hives    Physical Exam:  /74   General: comfortable appearing  Abdomen: soft, non-tender, gravid  TAUS: cephalic presentation  SVE: /-1     baseline, moderate variability, +accels, no decels  Sarah Ann: ctx q 2 minutes  A/P  29y  at 38w4d presents in early labor  -admit to labor and delivery  -augment with barrios balloon and pitocin  -GBS negative  -epidural PRN  -consents signed, all questions answered, patient expressed understanding  -discussed with senior resident Dr. Brown  -discussed with attending Dr. Padilla

## 2024-03-02 NOTE — OB PROVIDER DELIVERY SUMMARY - NSPROVIDERDELIVERYNOTE_OBGYN_ALL_OB_FT
28 y/o  at 38+4 presenting in early labor. She received an epidural for analgesia. Labor was augmented w/ barrios balloon and pitoin. SROM at unk time for clear fluid however became thick meconium during labor. Pt became fully dilated and pushed effectively. While pushing pt w/ persistent cat II FHT 2/2 recurrent variable decels. Pt also febrile to 38.5C. Due to cat II FHT and maternal exhaustion, decision was made to proceed w/ VAVD. Risks/benefits/alternatives discussed w/ pt and support person at bedside. Verbal consent given. Fetus in ROP position. Vacuum applied and pulled with maternal contractions. One pull, head delivered followed by body without difficulty. Infant immediately handed off to pediatrics. Placenta delivered spontaneously intact. Second degree laceration repaired w/ 2-0 chromic suture. Excellent hemostasis.   Mother and infant in stable condition. Dr. Padilla present for entire procedure

## 2024-03-03 LAB — KLEIHAUER-BETKE CALCULATION: 0 % — SIGNIFICANT CHANGE UP (ref 0–0.2)

## 2024-03-03 RX ADMIN — Medication 600 MILLIGRAM(S): at 12:21

## 2024-03-03 RX ADMIN — Medication 975 MILLIGRAM(S): at 15:00

## 2024-03-03 RX ADMIN — Medication 1 TABLET(S): at 12:21

## 2024-03-03 RX ADMIN — Medication 600 MILLIGRAM(S): at 07:06

## 2024-03-03 RX ADMIN — Medication 975 MILLIGRAM(S): at 08:48

## 2024-03-03 RX ADMIN — Medication 600 MILLIGRAM(S): at 00:38

## 2024-03-03 RX ADMIN — Medication 600 MILLIGRAM(S): at 18:06

## 2024-03-03 RX ADMIN — Medication 600 MILLIGRAM(S): at 06:53

## 2024-03-03 RX ADMIN — OXYCODONE HYDROCHLORIDE 5 MILLIGRAM(S): 5 TABLET ORAL at 22:43

## 2024-03-03 RX ADMIN — SODIUM CHLORIDE 3 MILLILITER(S): 9 INJECTION INTRAMUSCULAR; INTRAVENOUS; SUBCUTANEOUS at 06:06

## 2024-03-03 RX ADMIN — Medication 975 MILLIGRAM(S): at 02:40

## 2024-03-03 RX ADMIN — Medication 975 MILLIGRAM(S): at 03:19

## 2024-03-03 NOTE — PROGRESS NOTE ADULT - ASSESSMENT
A/P  29y s/p VAVD c/b maternal temperature x1 now afebrile, PPD# 1, stable, meeting postpartum milestones   - Pain: well controlled on analgesics as above  - GI: Tolerating regular diet  - : urinating without difficulty/pain  - DVT prophylaxis: ambulating frequently  - Dispo: PPD 2, unless otherwise specified

## 2024-03-03 NOTE — PROGRESS NOTE ADULT - SUBJECTIVE AND OBJECTIVE BOX
Patient evaluated at bedside this morning, resting comfortable in bed, no acute events overnight. She reports pain is well-controlled.  She denies headache, dizziness, changes in vision, chest pain, palpitations, shortness of breath, RUQ pain, nausea, vomiting, fever, chills, heavy vaginal bleeding.  She has been ambulating without assistance, voiding spontaneously, tolerating food.      Physical Exam:  T(C): 36.7 (03-03-24 @ 02:19), Max: 36.8 (03-02-24 @ 18:24)  HR: 91 (03-03-24 @ 02:19) (79 - 97)  BP: 101/61 (03-03-24 @ 02:19) (101/61 - 109/69)  RR: 18 (03-03-24 @ 02:19) (18 - 18)  SpO2: 97% (03-03-24 @ 02:19) (97% - 98%)    Gen: no apparent distress  Pulm: no increased work of breathing  Abd: soft, nontender, nondistended, no rebound or guarding, uterus firm  Extremities: trace pedal edema bilaterally, no calf tenderness bilaterally                          12.6   14.06 )-----------( 277      ( 02 Mar 2024 01:58 )             36.3     03-02    134<L>  |  103  |  8   ----------------------------<  94  3.5   |  18<L>  |  0.68    Ca    8.7      02 Mar 2024 13:37    TPro  5.6<L>  /  Alb  3.0<L>  /  TBili  0.3  /  DBili  x   /  AST  16  /  ALT  8<L>  /  AlkPhos  114  03-02    acetaminophen     Tablet .. 975 milliGRAM(s) Oral <User Schedule>  benzocaine 20%/menthol 0.5% Spray 1 Spray(s) Topical every 6 hours PRN  dibucaine 1% Ointment 1 Application(s) Topical every 6 hours PRN  diphenhydrAMINE 25 milliGRAM(s) Oral every 6 hours PRN  diphtheria/tetanus/pertussis (acellular) Vaccine (Adacel) 0.5 milliLiter(s) IntraMuscular once  hydrocortisone 1% Cream 1 Application(s) Topical every 6 hours PRN  ibuprofen  Tablet. 600 milliGRAM(s) Oral every 6 hours  lanolin Ointment 1 Application(s) Topical every 6 hours PRN  magnesium hydroxide Suspension 30 milliLiter(s) Oral two times a day PRN  oxyCODONE    IR 5 milliGRAM(s) Oral every 3 hours PRN  oxytocin Infusion 41.667 milliUNIT(s)/Min IV Continuous <Continuous>  pramoxine 1%/zinc 5% Cream 1 Application(s) Topical every 4 hours PRN  prenatal multivitamin 1 Tablet(s) Oral daily  simethicone 80 milliGRAM(s) Chew every 4 hours PRN  sodium chloride 0.9% lock flush 3 milliLiter(s) IV Push every 8 hours  witch hazel Pads 1 Application(s) Topical every 4 hours PRN

## 2024-03-04 ENCOUNTER — TRANSCRIPTION ENCOUNTER (OUTPATIENT)
Age: 29
End: 2024-03-04

## 2024-03-04 VITALS
HEART RATE: 99 BPM | SYSTOLIC BLOOD PRESSURE: 114 MMHG | DIASTOLIC BLOOD PRESSURE: 69 MMHG | OXYGEN SATURATION: 99 % | RESPIRATION RATE: 18 BRPM | TEMPERATURE: 98 F

## 2024-03-04 PROCEDURE — 87340 HEPATITIS B SURFACE AG IA: CPT

## 2024-03-04 PROCEDURE — 83615 LACTATE (LD) (LDH) ENZYME: CPT

## 2024-03-04 PROCEDURE — 86780 TREPONEMA PALLIDUM: CPT

## 2024-03-04 PROCEDURE — 86850 RBC ANTIBODY SCREEN: CPT

## 2024-03-04 PROCEDURE — 80053 COMPREHEN METABOLIC PANEL: CPT

## 2024-03-04 PROCEDURE — 85384 FIBRINOGEN ACTIVITY: CPT

## 2024-03-04 PROCEDURE — 36415 COLL VENOUS BLD VENIPUNCTURE: CPT

## 2024-03-04 PROCEDURE — 86762 RUBELLA ANTIBODY: CPT

## 2024-03-04 PROCEDURE — 59050 FETAL MONITOR W/REPORT: CPT

## 2024-03-04 PROCEDURE — 86870 RBC ANTIBODY IDENTIFICATION: CPT

## 2024-03-04 PROCEDURE — 86901 BLOOD TYPING SEROLOGIC RH(D): CPT

## 2024-03-04 PROCEDURE — 85025 COMPLETE CBC W/AUTO DIFF WBC: CPT

## 2024-03-04 PROCEDURE — 86900 BLOOD TYPING SEROLOGIC ABO: CPT

## 2024-03-04 PROCEDURE — 84550 ASSAY OF BLOOD/URIC ACID: CPT

## 2024-03-04 PROCEDURE — 85460 HEMOGLOBIN FETAL: CPT

## 2024-03-04 PROCEDURE — 88307 TISSUE EXAM BY PATHOLOGIST: CPT

## 2024-03-04 PROCEDURE — 86880 COOMBS TEST DIRECT: CPT

## 2024-03-04 RX ORDER — ACETAMINOPHEN 500 MG
3 TABLET ORAL
Qty: 0 | Refills: 0 | DISCHARGE
Start: 2024-03-04

## 2024-03-04 RX ORDER — BENZOCAINE 10 %
1 GEL (GRAM) MUCOUS MEMBRANE
Qty: 0 | Refills: 0 | DISCHARGE
Start: 2024-03-04

## 2024-03-04 RX ORDER — IBUPROFEN 200 MG
1 TABLET ORAL
Qty: 0 | Refills: 0 | DISCHARGE
Start: 2024-03-04

## 2024-03-04 RX ORDER — OXYCODONE HYDROCHLORIDE 5 MG/1
1 TABLET ORAL
Qty: 3 | Refills: 0
Start: 2024-03-04 | End: 2024-03-04

## 2024-03-04 RX ADMIN — Medication 975 MILLIGRAM(S): at 15:43

## 2024-03-04 RX ADMIN — Medication 1 APPLICATION(S): at 09:47

## 2024-03-04 RX ADMIN — OXYCODONE HYDROCHLORIDE 5 MILLIGRAM(S): 5 TABLET ORAL at 03:29

## 2024-03-04 RX ADMIN — Medication 600 MILLIGRAM(S): at 11:26

## 2024-03-04 RX ADMIN — Medication 975 MILLIGRAM(S): at 09:51

## 2024-03-04 RX ADMIN — Medication 975 MILLIGRAM(S): at 08:51

## 2024-03-04 RX ADMIN — Medication 1 TABLET(S): at 11:27

## 2024-03-04 RX ADMIN — Medication 975 MILLIGRAM(S): at 14:43

## 2024-03-04 RX ADMIN — OXYCODONE HYDROCHLORIDE 5 MILLIGRAM(S): 5 TABLET ORAL at 10:08

## 2024-03-04 RX ADMIN — OXYCODONE HYDROCHLORIDE 5 MILLIGRAM(S): 5 TABLET ORAL at 11:08

## 2024-03-04 NOTE — DISCHARGE NOTE OB - MEDICATION SUMMARY - MEDICATIONS TO TAKE
I will START or STAY ON the medications listed below when I get home from the hospital:    ibuprofen 600 mg oral tablet  -- 1 tab(s) by mouth every 6 hours  -- Indication: For Pain    acetaminophen 325 mg oral tablet  -- 3 tab(s) by mouth every 6 hours  -- Indication: For Pain    benzocaine 20% topical spray  -- 1 Apply on skin to affected area every 6 hours As needed for Perineal discomfort  -- Indication: For Perineal pain    Prenatal Multivitamins with Folic Acid 1 mg oral tablet  -- 1 tab(s) by mouth once a day  -- Indication: For Postpartum   I will START or STAY ON the medications listed below when I get home from the hospital:    ibuprofen 600 mg oral tablet  -- 1 tab(s) by mouth every 6 hours  -- Indication: For Pain    acetaminophen 325 mg oral tablet  -- 3 tab(s) by mouth every 6 hours  -- Indication: For Pain    oxyCODONE 5 mg oral tablet  -- 1 tab(s) by mouth every 8 hours as needed for  severe pain MDD: 3  -- Indication: For pain    benzocaine 20% topical spray  -- 1 Apply on skin to affected area every 6 hours As needed for Perineal discomfort  -- Indication: For Perineal pain    Prenatal Multivitamins with Folic Acid 1 mg oral tablet  -- 1 tab(s) by mouth once a day  -- Indication: For Postpartum

## 2024-03-04 NOTE — PROGRESS NOTE ADULT - SUBJECTIVE AND OBJECTIVE BOX
Patient evaluated at bedside this morning, resting comfortable in bed, no acute events overnight.  She reports pain is well controlled with tylenol and motrin but has perineal soreness.  She denies heavy vaginal bleeding. She has been ambulating without assistance, voiding spontaneously.  Tolerating food well, without nausea/vomit.      Physical Exam:  T(C): 36.9 (03-04-24 @ 01:56), Max: 36.9 (03-04-24 @ 01:56)  HR: 99 (03-04-24 @ 01:56) (87 - 99)  BP: 110/71 (03-04-24 @ 01:56) (101/61 - 110/71)  RR: 17 (03-04-24 @ 01:56) (17 - 17)  SpO2: 97% (03-04-24 @ 01:56) (97% - 98%)    GA: comfortable-appearing, NAD  Pulm: no increased work of breathing  Abd: soft, nontender, nondistended, no rebound or guarding, uterus firm.  Extremities: no calf tenderness      03-02    134<L>  |  103  |  8   ----------------------------<  94  3.5   |  18<L>  |  0.68    Ca    8.7      02 Mar 2024 13:37    TPro  5.6<L>  /  Alb  3.0<L>  /  TBili  0.3  /  DBili  x   /  AST  16  /  ALT  8<L>  /  AlkPhos  114  03-02    acetaminophen     Tablet .. 975 milliGRAM(s) Oral <User Schedule>  benzocaine 20%/menthol 0.5% Spray 1 Spray(s) Topical every 6 hours PRN  dibucaine 1% Ointment 1 Application(s) Topical every 6 hours PRN  diphenhydrAMINE 25 milliGRAM(s) Oral every 6 hours PRN  diphtheria/tetanus/pertussis (acellular) Vaccine (Adacel) 0.5 milliLiter(s) IntraMuscular once  hydrocortisone 1% Cream 1 Application(s) Topical every 6 hours PRN  ibuprofen  Tablet. 600 milliGRAM(s) Oral every 6 hours  lanolin Ointment 1 Application(s) Topical every 6 hours PRN  magnesium hydroxide Suspension 30 milliLiter(s) Oral two times a day PRN  oxyCODONE    IR 5 milliGRAM(s) Oral every 3 hours PRN  oxytocin Infusion 41.667 milliUNIT(s)/Min IV Continuous <Continuous>  pramoxine 1%/zinc 5% Cream 1 Application(s) Topical every 4 hours PRN  prenatal multivitamin 1 Tablet(s) Oral daily  simethicone 80 milliGRAM(s) Chew every 4 hours PRN  sodium chloride 0.9% lock flush 3 milliLiter(s) IV Push every 8 hours  witch hazel Pads 1 Application(s) Topical every 4 hours PRN

## 2024-03-04 NOTE — PROGRESS NOTE ADULT - ASSESSMENT
A/P 29y s/p VAVD, PPD# 2, stable, meeting postpartum milestones   - Pain: well controlled on tylenol/motrin, recommended sitz bath  - GI: Tolerating regular diet  - : urinating without difficulty/pain  - DVT prophylaxis: ambulating frequently  - Dispo: PPD 2, unless otherwise specified

## 2024-03-04 NOTE — DISCHARGE NOTE OB - NS MD DC FALL RISK RISK
For information on Fall & Injury Prevention, visit: [email or web address] OR  [email or web address] OR  https://www.cdc.gov/steadi/patient.html

## 2024-03-04 NOTE — DISCHARGE NOTE OB - CARE PROVIDER_API CALL
Cameron Vaz  Obstetrics and Gynecology  [address] [address]  Phone: [phone]  Fax: [phone]  Follow Up Time: 2 months

## 2024-03-04 NOTE — DISCHARGE NOTE OB - PATIENT PORTAL LINK FT
You can access the FollowMyHealth Patient Portal offered by [facility] by registering at the following website: [email or web address]. By joining [employer]’s FollowMyHealth portal, you will also be able to view your health information using other applications (apps) compatible with our system.

## 2024-03-07 ENCOUNTER — APPOINTMENT (OUTPATIENT)
Age: 29
End: 2024-03-07

## 2024-03-08 DIAGNOSIS — Z98.890 OTHER SPECIFIED POSTPROCEDURAL STATES: ICD-10-CM

## 2024-03-08 DIAGNOSIS — D50.9 IRON DEFICIENCY ANEMIA, UNSPECIFIED: ICD-10-CM

## 2024-03-08 DIAGNOSIS — Z3A.38 38 WEEKS GESTATION OF PREGNANCY: ICD-10-CM

## 2024-03-13 LAB — SURGICAL PATHOLOGY STUDY: SIGNIFICANT CHANGE UP

## 2024-03-17 ENCOUNTER — NON-APPOINTMENT (OUTPATIENT)
Age: 29
End: 2024-03-17

## 2024-03-18 ENCOUNTER — APPOINTMENT (OUTPATIENT)
Dept: OBGYN | Facility: CLINIC | Age: 29
End: 2024-03-18

## 2024-04-05 NOTE — HISTORY OF PRESENT ILLNESS
[de-identified] : Radha Foster is a 28-year-old pregnant female (3rd trimester) who presents to the clinic for follow iron deficiency anemia and B12 deficiency.  Heme hx: No significant past medical history.  She transferred her care recently from [place] [facility]  Currently pregnant around 11 weeks. Family history, no notable history of anemia in her family. Past medical history: None Past surgical history history: None Social history: No alcohol use, no smoking Meds: She takes prenatal vitamins Allergies: No known drug allergies  She recently had blood work done with her new gynecologist on 10/12 which showed hemoglobin of 9.2, MCV of 81.6, RDW of 16.4% and platelets of 408. At the time blood work also showed an iron level of 20, iron saturation of 4% and ferritin of 6. Her reticulocyte count was 1.6%-hypoproliferation likely due to iron deficiency.  She reports she never received blood transfusions before, and does not recall ever being told she is anemic.  She currently denies any bleeding.  No blood in her stool or urine. 1/8/24: Since her last visit she received 3 doses of IV Venofer and 3 doses of B12 injection.  She feels much better, her fatigue has improved.  She is taking folic acid every day. She is not taking p.o. iron because it makes her feel nauseous. [de-identified] : She denies any shortness of breath, chest pain, does report however occasional fatigue.  No blood in urine or stool.  No vaginal bleeding. She is following with obstetrics for her current pregnancy.

## 2024-04-09 ENCOUNTER — APPOINTMENT (OUTPATIENT)
Age: 29
End: 2024-04-09

## 2024-04-18 ENCOUNTER — APPOINTMENT (OUTPATIENT)
Dept: HEMATOLOGY ONCOLOGY | Facility: CLINIC | Age: 29
End: 2024-04-18

## 2024-04-22 ENCOUNTER — APPOINTMENT (OUTPATIENT)
Dept: OBGYN | Facility: CLINIC | Age: 29
End: 2024-04-22
Payer: MEDICAID

## 2024-04-22 VITALS
BODY MASS INDEX: 26.12 KG/M2 | DIASTOLIC BLOOD PRESSURE: 82 MMHG | HEIGHT: 64 IN | SYSTOLIC BLOOD PRESSURE: 144 MMHG | WEIGHT: 153 LBS

## 2024-04-22 DIAGNOSIS — R10.13 EPIGASTRIC PAIN: ICD-10-CM

## 2024-04-22 RX ORDER — CALCIUM CARBONATE 500 MG/1
500 TABLET, CHEWABLE ORAL
Qty: 30 | Refills: 1 | Status: ACTIVE | COMMUNITY
Start: 2024-04-22 | End: 1900-01-01

## 2024-04-22 RX ORDER — FAMOTIDINE 40 MG/1
40 TABLET, FILM COATED ORAL TWICE DAILY
Qty: 30 | Refills: 1 | Status: ACTIVE | COMMUNITY
Start: 2024-04-22 | End: 1900-01-01

## 2024-04-22 NOTE — END OF VISIT
[] : Resident [Resident] : Resident [FreeTextEntry2] : Explained importance of close follow up for BPs/possible PP PEC evaluation and RUQ/epigastric evaluation. Aware she can present to [facility] ED anytime and OB/Gyn will see her on arrival.

## 2024-04-22 NOTE — HISTORY OF PRESENT ILLNESS
[Postpartum Follow Up] : postpartum follow up [Delivery Date: ___] : on [unfilled] [Male] : Delivery History: baby boy [Breastfeeding] : currently nursing [Vacuum Assist] : delivered by vaginal delivery using vaccum assist [Back Pain] : back pain [Nausea] : nausea [Vomiting] : vomiting [Resumed Menses] : has not resumed her menses [Resumed [place]] : has not resumed intercourse [Intended Contraception] : the patient does not intended to use contraception postpartum [Chest Pain] : no chest pain [Cracked Nipples] : no cracked nipples [S/Sx PP Depression] : no signs/symptoms of postpartum depression [Heavy Bleeding] : no heavy bleeding [Shortness of Breath] : no shortness of breath [Chills] : no chills [Fatigue] : no fatigue [Fever] : no fever [Headache] : no headache [de-identified] : Patient is a 28yo  s/p VAVD on 3/2/2024. Overall patient reports doing well. She has been experiencing epigastric pain since two weeks since delivery. She reports that at nighttime she will have sudden onset "swelling" sensation in her epigastrum area. it will last for 30 minutes ot 1 hour and will resolved spontaneously. The last episode of epigastric swelling/pain was accompanied by vomitting. She reports that this has only happened 4 times since delivery. She denies fever/chills, constipation, urinary frequency. Denies association with eating. Denies blood in stool. Denies diarrhea.   The patient is currently breastfeeding and bottle feeding. She denies any breast tenderness or massess. No breast erythema or discharge. She has not resumed her menses. She has not resumed intercourse. She is not interested in contraception at this time. [de-identified] : #Epigatric pain:  - Initial blood pressure at visit elevated 144/82. patient with no history of elevated blood pressures during antepartum or intrapartum. Denies headache, blurry vision, chest pain, SOB, RUQ pain.  - Repeat blood pressure 111/87 and 130/90 manually - patient currently 7 weeks postpartum so preeclampsia would be unlikely but cannot be excluded. Recommend evaluation in emergency department at this time; however, patient has no one to take care of her baby right now.  - Will obtain PEC labs in case unable to go to ED today. - Recommend RUQ ultrasound.  - Patient to return to clinic in 1 week for blood pressure check and symptom check or ideally to ED for sooner evaluation if condition worsens. - Recommend pepcid and tums to attempt improvement of epigastric pain.   # Postpartum state:  - Will perform vaginal exam during next visit to ensure second degree laceration is healing well.  - EDPS score 2.  D/w Dr. Vaz [place] PGY1

## 2024-04-23 LAB
ALBUMIN SERPL ELPH-MCNC: 4.4 G/DL
ALP BLD-CCNC: 115 U/L
ALT SERPL-CCNC: 19 U/L
ANION GAP SERPL CALC-SCNC: 11 MMOL/L
AST SERPL-CCNC: 14 U/L
BILIRUB SERPL-MCNC: 0.2 MG/DL
BUN SERPL-MCNC: 11 MG/DL
CALCIUM SERPL-MCNC: 9.5 MG/DL
CHLORIDE SERPL-SCNC: 105 MMOL/L
CO2 SERPL-SCNC: 22 MMOL/L
CREAT SERPL-MCNC: 0.64 MG/DL
EGFR: 123 ML/MIN/1.73M2
FIBRINOGEN PPP-MCNC: 266 MG/DL
GLUCOSE SERPL-MCNC: 87 MG/DL
HCT VFR BLD CALC: 36.5 %
HGB BLD-MCNC: 12.1 G/DL
LDH SERPL-CCNC: 153 U/L
MCHC RBC-ENTMCNC: 28.7 PG
MCHC RBC-ENTMCNC: 33.2 GM/DL
MCV RBC AUTO: 86.5 FL
PLATELET # BLD AUTO: 348 K/UL
POTASSIUM SERPL-SCNC: 4.1 MMOL/L
PROT SERPL-MCNC: 7.3 G/DL
RBC # BLD: 4.22 M/UL
RBC # FLD: 13.3 %
SODIUM SERPL-SCNC: 139 MMOL/L
URATE SERPL-MCNC: 4.7 MG/DL
WBC # FLD AUTO: 7.44 K/UL

## 2024-05-13 ENCOUNTER — APPOINTMENT (OUTPATIENT)
Dept: OBGYN | Facility: CLINIC | Age: 29
End: 2024-05-13

## 2024-05-21 ENCOUNTER — APPOINTMENT (OUTPATIENT)
Dept: OBGYN | Facility: CLINIC | Age: 29
End: 2024-05-21

## 2024-10-10 NOTE — ED ADULT NURSE NOTE - NS ED NURSE LEVEL OF CONSCIOUSNESS ORIENTATION
I have reviewed and am in agreement with the assessment and documentation as performed by my orientee, CHADD Astorga. Please refer to Gauri's progress note for update on pt's daily events.     Mariana Sabillon RN     Oriented - self; Oriented - place; Oriented - time

## 2025-04-10 NOTE — ED ADULT NURSE NOTE - NS ED NURSE ED ASSMT LEARNING IMPAIRMENTS
Adult Annual Physical  Name: Kary Sandoval      : 1988      MRN: [id number]  Encounter Provider: Ben Suarez PA-C  Encounter Date: 4/10/2025   Encounter department: [facility]    :  Assessment & Plan  Annual physical exam         Morbid obesity with BMI of 50.0-59.9, adult (HCC)  Labs as ordered.  Referral provided to weight management.  Orders:  •  CBC and differential; Future  •  Comprehensive metabolic panel; Future  •  TSH, 3rd generation with Free T4 reflex; Future  •  Lipid panel; Future  •  Hemoglobin A1C; Future  •  Ambulatory Referral to Weight Management; Future    Vision screen without abnormal findings             Preventive Screenings:  - Diabetes Screening: risks/benefits discussed and orders placed  - Cholesterol Screening: risks/benefits discussed and orders placed   - Hepatitis C screening: screening up-to-date   - HIV screening: screening up-to-date   - Cervical cancer screening: screening up-to-date   - Colon cancer screening: screening not indicated   - Lung cancer screening: screening not indicated     Immunizations:  - Immunizations due: Influenza and Prevnar 20  - Risks/benefits immunizations discussed      Counseling/Anticipatory Guidance:  - Alcohol: discussed moderation in alcohol intake and recommendations for healthy alcohol use.   - Drug use: discussed harms of illicit drug use and how it can negatively impact mental/physical health.   - Tobacco use: discussed harms of tobacco use and management options for quitting.   - Dental health: discussed importance of regular tooth brushing, flossing, and dental visits.   - Sexual health: discussed sexually transmitted diseases, partner selection, use of condoms, avoidance of unintended pregnancy, and contraceptive alternatives.   - Diet: discussed recommendations for a healthy/well-balanced diet.   - Exercise: the importance of regular exercise/physical activity was discussed. Recommend exercise 3-5  times per week for at least 30 minutes.   - Injury prevention: discussed safety/seat belts, safety helmets, smoke detectors, carbon monoxide detectors, and smoking near bedding or upholstery.       Depression Screening and Follow-up Plan: Patient was screened for depression during today's encounter. They screened negative with a PHQ-2 score of 0.      Tobacco Cessation Counseling: Tobacco cessation counseling was provided. The patient is sincerely urged to quit consumption of tobacco. She is not ready to quit tobacco. Medication options not discussed.         History of Present Illness     Adult Annual Physical:  Patient presents for annual physical.     Diet and Physical Activity:  - Diet/Nutrition: no special diet.  - Exercise: no formal exercise.    Depression Screening:  - PHQ-2 Score: 0    General Health:  - Sleep: sleeps well.  - Hearing: normal hearing bilateral ears.  - Vision: no vision problems and wears glasses.  - Dental: regular dental visits.    /GYN Health:  - Follows with GYN: yes.   - Menopause: premenopausal.   - History of STDs: no    Advanced Care Planning:  - Has an advanced directive?: no    - Has a durable medical POA?: no      Review of Systems   Constitutional:  Negative for chills, diaphoresis and fever.   Eyes:  Negative for visual disturbance.   Respiratory:  Negative for cough, chest tightness, shortness of breath and wheezing.    Cardiovascular:  Negative for chest pain, palpitations and leg swelling.   Gastrointestinal:  Negative for abdominal pain, constipation, diarrhea, nausea and vomiting.   Genitourinary:  Negative for difficulty urinating, dysuria, frequency, hematuria and urgency.   Skin:  Negative for rash and wound.   Neurological:  Negative for dizziness, syncope, weakness, light-headedness and headaches.     Medical History Reviewed by provider this encounter:  Tobacco  Allergies  Meds  Problems  Med Hx  Surg Hx  Fam Hx     .  Current Outpatient Medications on File  Prior to Visit   Medication Sig Dispense Refill   • [DISCONTINUED] acetaminophen (TYLENOL) 500 mg tablet Take 1 tablet (500 mg total) by mouth every 6 (six) hours as needed for mild pain (Patient not taking: Reported on 11/22/2024) 30 tablet 0   • [DISCONTINUED] albuterol (2.5 mg/3 mL) 0.083 % nebulizer solution Take 3 mL (2.5 mg total) by nebulization every 6 (six) hours as needed for wheezing or shortness of breath (Patient not taking: Reported on 9/16/2024) 90 mL 0   • [DISCONTINUED] albuterol (Ventolin HFA) 90 mcg/act inhaler Inhale 2 puffs every 6 (six) hours as needed for wheezing (Patient not taking: Reported on 9/16/2024) 18 g 0   • [DISCONTINUED] benzonatate (TESSALON PERLES) 100 mg capsule Take 1 capsule (100 mg total) by mouth 3 (three) times a day as needed for cough (Patient not taking: Reported on 4/10/2025) 20 capsule 0   • [DISCONTINUED] guaiFENesin (MUCINEX) 600 mg 12 hr tablet Take 1,200 mg by mouth every 12 (twelve) hours (Patient not taking: Reported on 9/16/2024)     • [DISCONTINUED] methocarbamol (ROBAXIN) 750 mg tablet Take 1 tablet (750 mg total) by mouth every 8 (eight) hours as needed for muscle spasms (Patient not taking: Reported on 9/16/2024) 21 tablet 0   • [DISCONTINUED] naproxen (Naprosyn) 500 mg tablet Take 1 tablet (500 mg total) by mouth 2 (two) times a day with meals for 21 days 42 tablet 0   • [DISCONTINUED] ondansetron (ZOFRAN-ODT) 4 mg disintegrating tablet Take 1 tablet (4 mg total) by mouth every 6 (six) hours as needed for nausea or vomiting (Patient not taking: Reported on 4/10/2025) 20 tablet 0   • [DISCONTINUED] predniSONE 10 mg tablet 5 tabs daily x 2 days, 4 tabs daily x 2 days, 3 tabs daily x 2 days, 2 tabs daily x 2 days, 1 tab daily x 2 days (Patient not taking: Reported on 11/22/2024) 30 tablet 0     No current facility-administered medications on file prior to visit.      Social History     Tobacco Use   • Smoking status: Every Day     Current packs/day: 0.50      Average packs/day: 0.5 packs/day for 18.3 years (9.1 ttl pk-yrs)     Types: Cigarettes     Start date: 2007   • Smokeless tobacco: Never   Vaping Use   • Vaping status: Never Used   Substance and Sexual Activity   • Alcohol use: Not Currently     Alcohol/week: 0.0 standard drinks of alcohol   • Drug use: No   • Sexual activity: Yes       Objective   /78 (BP Location: Left arm, Patient Position: Sitting, Cuff Size: Large)   Pulse 88   Temp 98.1 °F (36.7 °C) (Tympanic)   Resp 18   Ht 5' (1.524 m)   Wt 127 kg (279 lb 3.2 oz)   SpO2 96%   BMI 54.53 kg/m²   Vision Screening    Right eye Left eye Both eyes   Without correction      With correction 20/25 20/25 20/20      Physical Exam  Vitals and nursing note reviewed.   Constitutional:       General: She is not in acute distress.     Appearance: Normal appearance. She is obese.   HENT:      Head: Normocephalic and atraumatic.   Eyes:      Extraocular Movements: Extraocular movements intact.      Conjunctiva/sclera: Conjunctivae normal.      Pupils: Pupils are equal, round, and reactive to light.   Cardiovascular:      Rate and Rhythm: Normal rate and regular rhythm.      Heart sounds: Normal heart sounds. No murmur heard.  Pulmonary:      Effort: Pulmonary effort is normal. No respiratory distress.      Breath sounds: Normal breath sounds. No wheezing.   Musculoskeletal:         General: Normal range of motion.      Cervical back: Normal range of motion and neck supple.      Right lower leg: No edema.      Left lower leg: No edema.   Lymphadenopathy:      Cervical: No cervical adenopathy.   Skin:     General: Skin is warm and dry.   Neurological:      General: No focal deficit present.      Mental Status: She is alert and oriented to person, place, and time.      Cranial Nerves: No cranial nerve deficit.      Motor: No weakness.      Coordination: Coordination normal.      Gait: Gait normal.   Psychiatric:         Mood and Affect: Mood normal.          Behavior: Behavior normal.          None

## 2025-06-12 NOTE — OB PROVIDER TRIAGE NOTE - NSLOWDOSEASPIRIN_OBGYN_ALL_OB
Anesthesia Pre Eval Note    Anesthesia ROS/Med Hx        Anesthetic Complication History:    Patient does not have a history of anesthetic complications      Pulmonary Review:  Patient does not have a pulmonary history      Neuro/Psych Review:       Positive for headaches  Positive for psychiatric history - Depression    Cardiovascular Review:     Positive for hyperlipidemia    GI/HEPATIC/RENAL Review:  Patient does not have a GI/hepatic/renalhistory       End/Other Review:  Patient does not have an endo/other history    Relevant Problems   No relevant active problems       Physical Exam     Airway   Mallampati: II  TM Distance: >3 FB  Neck ROM: Full    Cardiovascular    Cardio Rhythm: Regular  Cardio Rate: Normal    Dental Exam    Patient has:  Denied broken/chipped/loose teeth    Pulmonary Exam    Breath sounds clear to auscultation:  Yes    Vitals:   Patient Vitals for the past 4 hrs (Last 1 readings):   Temp Temp src Height Weight   06/12/25 0741 36.7 °C (98 °F) Oral 5' 5\" (1.651 m) 60.4 kg (133 lb 2.5 oz)         Anesthesia Plan:    ASA Status: 2  Anesthesia Type: MAC      Post-op Pain Management: Per Surgeon      Checklist  Reviewed: Lab Results, Medications, Problem list, Allergies, Past Med History, Care Everywhere and NPO Status  Consent/Risks Discussed Statement:  The proposed anesthetic plan, including its risks and benefits, have been discussed with the Patient along with the risks and benefits of alternatives. Questions were encouraged and answered and the patient and/or representative understands and agrees to proceed.        I discussed with the patient (and/or patient's legal representative) the risks and benefits of the proposed anesthesia plan, MAC, which may include services performed by other anesthesia providers.    Alternative anesthesia plans, if available, were reviewed with the patient (and/or patient's legal representative). Discussion has been held with the patient (and/or patient's legal  representative) regarding risks of anesthesia, which include conversion to general anesthesia, intra-operative awareness, nausea and vomiting and emergent situations that may require change in anesthesia plan.    The patient (and/or patient's legal representative) has indicated understanding, his/her questions have been answered, and he/she wishes to proceed with the planned anesthetic.   No